# Patient Record
Sex: MALE | Race: WHITE | Employment: FULL TIME | URBAN - NONMETROPOLITAN AREA
[De-identification: names, ages, dates, MRNs, and addresses within clinical notes are randomized per-mention and may not be internally consistent; named-entity substitution may affect disease eponyms.]

---

## 2022-09-22 ENCOUNTER — HOSPITAL ENCOUNTER (OUTPATIENT)
Dept: GENERAL RADIOLOGY | Age: 52
Discharge: HOME | End: 2022-09-22
Payer: COMMERCIAL

## 2022-09-22 ENCOUNTER — OFFICE VISIT (OUTPATIENT)
Dept: RHEUMATOLOGY | Age: 52
End: 2022-09-22
Payer: COMMERCIAL

## 2022-09-22 ENCOUNTER — HOSPITAL ENCOUNTER (OUTPATIENT)
Age: 52
Discharge: HOME | End: 2022-09-22
Payer: COMMERCIAL

## 2022-09-22 ENCOUNTER — NURSE ONLY (OUTPATIENT)
Dept: LAB | Age: 52
End: 2022-09-22

## 2022-09-22 VITALS
SYSTOLIC BLOOD PRESSURE: 121 MMHG | DIASTOLIC BLOOD PRESSURE: 80 MMHG | OXYGEN SATURATION: 96 % | WEIGHT: 269 LBS | HEIGHT: 74 IN | HEART RATE: 80 BPM | BODY MASS INDEX: 34.52 KG/M2

## 2022-09-22 DIAGNOSIS — M79.642 PAIN IN BOTH HANDS: ICD-10-CM

## 2022-09-22 DIAGNOSIS — M25.512 CHRONIC PAIN OF BOTH SHOULDERS: ICD-10-CM

## 2022-09-22 DIAGNOSIS — G89.29 CHRONIC PAIN OF BOTH SHOULDERS: ICD-10-CM

## 2022-09-22 DIAGNOSIS — M25.511 CHRONIC PAIN OF BOTH SHOULDERS: ICD-10-CM

## 2022-09-22 DIAGNOSIS — M79.641 PAIN IN BOTH HANDS: ICD-10-CM

## 2022-09-22 DIAGNOSIS — M19.90 INFLAMMATORY ARTHRITIS: ICD-10-CM

## 2022-09-22 DIAGNOSIS — M19.90 INFLAMMATORY ARTHRITIS: Primary | ICD-10-CM

## 2022-09-22 LAB
ALBUMIN SERPL-MCNC: 4.5 G/DL (ref 3.5–5.1)
ALP BLD-CCNC: 62 U/L (ref 38–126)
ALT SERPL-CCNC: 21 U/L (ref 11–66)
ANION GAP SERPL CALCULATED.3IONS-SCNC: 12 MEQ/L (ref 8–16)
AST SERPL-CCNC: 14 U/L (ref 5–40)
BASOPHILS # BLD: 0.5 %
BASOPHILS ABSOLUTE: 0.1 THOU/MM3 (ref 0–0.1)
BILIRUB SERPL-MCNC: 0.6 MG/DL (ref 0.3–1.2)
BUN BLDV-MCNC: 25 MG/DL (ref 7–22)
C-REACTIVE PROTEIN: < 0.3 MG/DL (ref 0–1)
CALCIUM SERPL-MCNC: 9.8 MG/DL (ref 8.5–10.5)
CHLORIDE BLD-SCNC: 102 MEQ/L (ref 98–111)
CO2: 24 MEQ/L (ref 23–33)
CREAT SERPL-MCNC: 0.8 MG/DL (ref 0.4–1.2)
EOSINOPHIL # BLD: 0.2 %
EOSINOPHILS ABSOLUTE: 0 THOU/MM3 (ref 0–0.4)
ERYTHROCYTE [DISTWIDTH] IN BLOOD BY AUTOMATED COUNT: 12.9 % (ref 11.5–14.5)
ERYTHROCYTE [DISTWIDTH] IN BLOOD BY AUTOMATED COUNT: 41.6 FL (ref 35–45)
GFR SERPL CREATININE-BSD FRML MDRD: > 90 ML/MIN/1.73M2
GLUCOSE BLD-MCNC: 99 MG/DL (ref 70–108)
HCT VFR BLD CALC: 44.2 % (ref 42–52)
HEMOGLOBIN: 14.4 GM/DL (ref 14–18)
IMMATURE GRANS (ABS): 0.04 THOU/MM3 (ref 0–0.07)
IMMATURE GRANULOCYTES: 0.3 %
LYMPHOCYTES # BLD: 9.9 %
LYMPHOCYTES ABSOLUTE: 1.2 THOU/MM3 (ref 1–4.8)
MCH RBC QN AUTO: 28.6 PG (ref 26–33)
MCHC RBC AUTO-ENTMCNC: 32.6 GM/DL (ref 32.2–35.5)
MCV RBC AUTO: 87.7 FL (ref 80–94)
MONOCYTES # BLD: 5.4 %
MONOCYTES ABSOLUTE: 0.7 THOU/MM3 (ref 0.4–1.3)
NUCLEATED RED BLOOD CELLS: 0 /100 WBC
PLATELET # BLD: 246 THOU/MM3 (ref 130–400)
PMV BLD AUTO: 9.9 FL (ref 9.4–12.4)
POTASSIUM SERPL-SCNC: 4.4 MEQ/L (ref 3.5–5.2)
RBC # BLD: 5.04 MILL/MM3 (ref 4.7–6.1)
SEDIMENTATION RATE, ERYTHROCYTE: 20 MM/HR (ref 0–10)
SEG NEUTROPHILS: 83.7 %
SEGMENTED NEUTROPHILS ABSOLUTE COUNT: 10.3 THOU/MM3 (ref 1.8–7.7)
SODIUM BLD-SCNC: 138 MEQ/L (ref 135–145)
TOTAL CK: 63 U/L (ref 55–170)
TOTAL PROTEIN: 7.4 G/DL (ref 6.1–8)
URIC ACID: 5.5 MG/DL (ref 3.7–7)
WBC # BLD: 12.3 THOU/MM3 (ref 4.8–10.8)

## 2022-09-22 PROCEDURE — 73130 X-RAY EXAM OF HAND: CPT

## 2022-09-22 PROCEDURE — 99204 OFFICE O/P NEW MOD 45 MIN: CPT | Performed by: INTERNAL MEDICINE

## 2022-09-22 RX ORDER — PREDNISONE 10 MG/1
30 TABLET ORAL DAILY
Qty: 90 TABLET | Refills: 0 | Status: SHIPPED | OUTPATIENT
Start: 2022-09-22 | End: 2022-11-01 | Stop reason: SDUPTHER

## 2022-09-22 RX ORDER — VIT C/B6/B5/MAGNESIUM/HERB 173 50-5-6-5MG
500 CAPSULE ORAL DAILY
COMMUNITY

## 2022-09-22 RX ORDER — PREDNISONE 10 MG/1
10 TABLET ORAL DAILY
COMMUNITY
Start: 2022-09-12 | End: 2022-09-22 | Stop reason: SDUPTHER

## 2022-09-22 RX ORDER — OLMESARTAN MEDOXOMIL 40 MG/1
40 TABLET ORAL DAILY
COMMUNITY
Start: 2022-07-27

## 2022-09-22 ASSESSMENT — ENCOUNTER SYMPTOMS
VOMITING: 0
BLOOD IN STOOL: 0
NAUSEA: 0
COUGH: 0
ABDOMINAL PAIN: 0
WHEEZING: 0
SHORTNESS OF BREATH: 0

## 2022-09-22 NOTE — PROGRESS NOTES
[facility]   Rheumatology Clinic Note      9/22/2022       Reason for Consult:  joint pain  Requesting Physician:  Ender Ray DO     CHIEF COMPLAINT:    Chief Complaint   Patient presents with    New Patient     New patient. Arthralgia, unspecified joint  Bilateral shoulders. Bilateral joints in fingers, bilateral knees. Bilateral hips           HISTORY OF PRESENT ILLNESS:    47 y.o. male presents today for evaluation of joint pain. This summer, started abruptly with dull ache in both shoulders. Right is worse than left. This as progressively worsen. Associated with stiffness. Has difficulty raising his arms without assistance. Was prescribed prednisone 15 mg, which helped a lot. Then symtpoms worsen, so prednsione dose was increased to 20 mg daily, which again helped. However, he is having pain and stiffness in his hsoulders upon awakening in the morning. Also noted pain in the hips and knees. Pain in the fingers for past few days. Father has connective tissue disorder with ILD. Past Medical History:     has no past medical history on file. Past Surgical History:     has a past surgical history that includes Lumbar disc surgery (2003) and Lumbar disc surgery (1997). Current Medications:      Current Outpatient Medications:     olmesartan (BENICAR) 40 MG tablet, Take 40 mg by mouth daily, Disp: , Rfl:     turmeric 500 MG CAPS, Take 500 mg by mouth daily, Disp: , Rfl:     predniSONE (DELTASONE) 10 MG tablet, Take 3 tablets by mouth daily, Disp: 90 tablet, Rfl: 0    Allergies:    Patient has no known allergies. Social History:     reports that he has never smoked. His smokeless tobacco use includes chew. Family History:   family history includes Arthritis in his father; Neurodegenerative disease  in his father; Parkinson's Disease in his father. REVIEW OF SYSTEMS:    Review of Systems   Constitutional:  Positive for fatigue.  Negative for chills, fever and unexpected weight change. HENT:  Negative for mouth sores. Respiratory:  Negative for cough, shortness of breath and wheezing. Cardiovascular:  Negative for chest pain and leg swelling. Gastrointestinal:  Negative for abdominal pain, blood in stool, nausea and vomiting. Skin:  Negative for rash (no psoriasis). Neurological:  Negative for headaches. PHYSICAL EXAM:    Vitals:    /80 (Site: Right Lower Arm, Position: Sitting, Cuff Size: Medium Adult)   Pulse 80   Ht 6' 2\" (1.88 m)   Wt 269 lb (122 kg)   SpO2 96%   BMI 34.54 kg/m²     Physical Exam  Constitutional:       General: He is not in acute distress. Appearance: Normal appearance. HENT:      Mouth/Throat:      Mouth: Mucous membranes are moist.      Pharynx: Oropharynx is clear. Eyes:      Extraocular Movements: Extraocular movements intact. Conjunctiva/sclera: Conjunctivae normal.   Cardiovascular:      Rate and Rhythm: Normal rate and regular rhythm. Heart sounds: Normal heart sounds. No murmur heard. No friction rub. Pulmonary:      Effort: Pulmonary effort is normal. No respiratory distress. Breath sounds: Normal breath sounds. No wheezing or rhonchi. Musculoskeletal:         General: Tenderness present. No swelling or deformity. Normal range of motion. Cervical back: Normal range of motion and neck supple. Right lower leg: No edema. Left lower leg: No edema. Comments: ROM shoulders is intact, but was very uncomfortable. No synovitis in small joints of hands, wrists, elbows, shoulders. No knee effusion. Lymphadenopathy:      Cervical: No cervical adenopathy. Skin:     General: Skin is warm and dry. Findings: No lesion or rash. Neurological:      General: No focal deficit present. Mental Status: He is alert and oriented to person, place, and time. Mental status is at baseline. Cranial Nerves: No cranial nerve deficit.       Gait: Gait normal.   Psychiatric: Mood and Affect: Mood normal.          DATA:              IMPRESSION/RECOMMENDATIONS:      1. Suspect inflammatory process. Possibly PMR. Maybe atypical presentation. His ESR prior to starting prednsione is not remarkable. However, has noted significant improvement in shoulder and hip girdle pain/weakness/stiffness.  Alternatively, PsA could be a differential. Possible nail pitting was seen on exam.  -- increase prednsione to 30 mg daily  -- labs today  -- xray hands  -- RTC in 3 weeks        Orders Placed This Encounter   Procedures    XR HAND RIGHT (MIN 3 VIEWS)     Standing Status:   Future     Number of Occurrences:   1     Standing Expiration Date:   9/22/2023     Order Specific Question:   Reason for exam:     Answer:   assess for inflammatory arthritis    XR HAND LEFT (MIN 3 VIEWS)     Standing Status:   Future     Number of Occurrences:   1     Standing Expiration Date:   9/22/2023     Order Specific Question:   Reason for exam:     Answer:   assess for inflammatory arthritis    [id number] Antigen     Standing Status:   Future     Number of Occurrences:   1     Standing Expiration Date:   9/22/2023    CBC with Auto Differential     Standing Status:   Future     Number of Occurrences:   1     Standing Expiration Date:   3/22/2023    Comprehensive Metabolic Panel     Standing Status:   Future     Number of Occurrences:   1     Standing Expiration Date:   3/22/2023    Sedimentation Rate     Standing Status:   Future     Number of Occurrences:   1     Standing Expiration Date:   3/22/2023    Uric Acid     Standing Status:   Future     Number of Occurrences:   1     Standing Expiration Date:   3/22/2023    C-Reactive Protein     Standing Status:   Future     Number of Occurrences:   1     Standing Expiration Date:   3/22/2023    CK     Standing Status:   Future     Number of Occurrences:   1     Standing Expiration Date:   3/22/2023        Leandrew Olszewski, MD    [address] [place] [address] [address]

## 2022-09-25 LAB — HLA-B27: NEGATIVE

## 2022-10-06 ENCOUNTER — OFFICE VISIT (OUTPATIENT)
Dept: RHEUMATOLOGY | Age: 52
End: 2022-10-06
Payer: COMMERCIAL

## 2022-10-06 VITALS
SYSTOLIC BLOOD PRESSURE: 110 MMHG | HEIGHT: 74 IN | DIASTOLIC BLOOD PRESSURE: 80 MMHG | WEIGHT: 274 LBS | HEART RATE: 66 BPM | BODY MASS INDEX: 35.16 KG/M2 | OXYGEN SATURATION: 95 %

## 2022-10-06 DIAGNOSIS — M19.90 INFLAMMATORY ARTHRITIS: Primary | ICD-10-CM

## 2022-10-06 DIAGNOSIS — Z79.899 ENCOUNTER FOR LONG-TERM (CURRENT) USE OF HIGH-RISK MEDICATION: ICD-10-CM

## 2022-10-06 LAB
ALBUMIN SERPL-MCNC: 4.8 G/DL
ALP BLD-CCNC: 58 U/L
ALT SERPL-CCNC: 20 U/L
ANION GAP SERPL CALCULATED.3IONS-SCNC: 10 MMOL/L
AST SERPL-CCNC: 13 U/L
BASOPHILS ABSOLUTE: 0.03 /ΜL
BASOPHILS RELATIVE PERCENT: 0.3 %
BILIRUB SERPL-MCNC: 1.1 MG/DL (ref 0.1–1.4)
BUN BLDV-MCNC: 25 MG/DL
C-REACTIVE PROTEIN: 0.3
CALCIUM SERPL-MCNC: 9.9 MG/DL
CHLORIDE BLD-SCNC: 98 MMOL/L
CO2: 30 MMOL/L
CREAT SERPL-MCNC: 0.87 MG/DL
EOSINOPHILS ABSOLUTE: 0.03 /ΜL
EOSINOPHILS RELATIVE PERCENT: 0.3 %
GFR CALCULATED: 104
GLUCOSE BLD-MCNC: 106 MG/DL
HCT VFR BLD CALC: 42.6 % (ref 41–53)
HEMOGLOBIN: 14.5 G/DL (ref 13.5–17.5)
LYMPHOCYTES ABSOLUTE: 0.97 /ΜL
LYMPHOCYTES RELATIVE PERCENT: 10 %
MCH RBC QN AUTO: 28.7 PG
MCHC RBC AUTO-ENTMCNC: 34 G/DL
MCV RBC AUTO: 84.4 FL
MONOCYTES ABSOLUTE: 0.56 /ΜL
MONOCYTES RELATIVE PERCENT: 5.8 %
NEUTROPHILS ABSOLUTE: 8.08 /ΜL
NEUTROPHILS RELATIVE PERCENT: 83.1 %
PDW BLD-RTO: 12.9 %
PLATELET # BLD: 245 K/ΜL
PMV BLD AUTO: 10.3 FL
POTASSIUM SERPL-SCNC: 4.4 MMOL/L
RBC # BLD: 5.05 10^6/ΜL
SEDIMENTATION RATE, ERYTHROCYTE: 18
SODIUM BLD-SCNC: 138 MMOL/L
TOTAL PROTEIN: 7.2
WBC # BLD: 9.7 10^3/ML

## 2022-10-06 PROCEDURE — 99214 OFFICE O/P EST MOD 30 MIN: CPT | Performed by: INTERNAL MEDICINE

## 2022-10-06 RX ORDER — FOLIC ACID 1 MG/1
1 TABLET ORAL DAILY
Qty: 90 TABLET | Refills: 1 | Status: SHIPPED | OUTPATIENT
Start: 2022-10-06 | End: 2023-01-04

## 2022-10-06 ASSESSMENT — ENCOUNTER SYMPTOMS
BACK PAIN: 0
SHORTNESS OF BREATH: 0
NAUSEA: 0
DIARRHEA: 0
PHOTOPHOBIA: 0
VOMITING: 0
COUGH: 0
ABDOMINAL PAIN: 0
RHINORRHEA: 0
WHEEZING: 0

## 2022-10-06 NOTE — PROGRESS NOTES
Deepa Gr (:  1970) is a 47 y.o. male,Established patient, here for evaluation of the following chief complaint(s):  Follow-up (2 week follow up)         ASSESSMENT/PLAN:  1. Inflammatory arthritis  -     CBC with Auto Differential; Future  -     Comprehensive Metabolic Panel; Future  -     C-Reactive Protein; Future  -     Sedimentation Rate; Future  -     methotrexate (RHEUMATREX) 2.5 MG chemo tablet; Take 6 tablets by mouth once a week, Disp-24 tablet, [id number]  -     folic acid (FOLVITE) 1 MG tablet; Take 1 tablet by mouth daily, Disp-90 tablet, R-1Normal  2. Encounter for long-term (current) use of high-risk medication  -     CBC with Auto Differential; Future  -     Comprehensive Metabolic Panel; Future  -     C-Reactive Protein; Future  -     Sedimentation Rate; Future  -     methotrexate (RHEUMATREX) 2.5 MG chemo tablet; Take 6 tablets by mouth once a week, Disp-24 tablet, [id number]  -     folic acid (FOLVITE) 1 MG tablet; Take 1 tablet by mouth daily, Disp-90 tablet, R-1Normal    Return in about 6 weeks (around 2022). Assessment / Plan:  Inflammatory Arthritis - Will start patient on Methotrexate and Folic Acid, and slowly taper off the steroids over weeks. Subjective   SUBJECTIVE/OBJECTIVE:  HPI    Patient was started on 30 mg prednisone at last office visit. He states that he take 20 mg in the morning and 10 mg in the evening between 6-7pm. His symptoms have improved significantly. Patient is now sleeping throughout the entire night without pain. He has brief stiffness in the morning, but much improved. Overall, he is able to function much better . Review of Systems   Constitutional:  Negative for diaphoresis, fatigue and fever. HENT:  Negative for congestion, postnasal drip and rhinorrhea. Eyes:  Negative for photophobia and visual disturbance. Respiratory:  Negative for cough, shortness of breath and wheezing.     Cardiovascular:  Negative for chest pain, palpitations and leg swelling. Gastrointestinal:  Negative for abdominal pain, diarrhea, nausea and vomiting. Genitourinary:  Negative for dysuria, frequency, hematuria and urgency. Musculoskeletal:  Negative for back pain and myalgias. Skin:  Negative for pallor and rash. Neurological:  Negative for dizziness, facial asymmetry, light-headedness and headaches. Psychiatric/Behavioral:  Negative for behavioral problems, confusion and decreased concentration. Objective   Physical Exam  Constitutional:       General: He is not in acute distress. Appearance: He is not ill-appearing or toxic-appearing. HENT:      Head: Normocephalic and atraumatic. Right Ear: External ear normal.      Left Ear: External ear normal.   Eyes:      General: No scleral icterus. Right eye: No discharge. Left eye: No discharge. Cardiovascular:      Rate and Rhythm: Normal rate and regular rhythm. Pulses: Normal pulses. Heart sounds: Normal heart sounds. No murmur heard. No friction rub. No gallop. Pulmonary:      Effort: Pulmonary effort is normal. No respiratory distress. Breath sounds: Normal breath sounds. No wheezing. Abdominal:      General: Abdomen is flat. Bowel sounds are normal. There is no distension. Tenderness: There is no abdominal tenderness. There is no guarding. Musculoskeletal:         General: Normal range of motion. Cervical back: Normal range of motion and neck supple. Right lower leg: No edema. Left lower leg: No edema. Skin:     General: Skin is warm and dry. Capillary Refill: Capillary refill takes less than 2 seconds. Neurological:      General: No focal deficit present. Mental Status: He is alert and oriented to person, place, and time. Psychiatric:         Mood and Affect: Mood normal.         Behavior: Behavior normal.          An electronic signature was used to authenticate this note.     --Luis Tarango MD Internal Medicine Resident, PGY-3

## 2022-10-06 NOTE — PROGRESS NOTES
never smoked. His smokeless tobacco use includes chew. Family History:   family history includes Arthritis in his father; Neurodegenerative disease  in his father; Parkinson's Disease in his father. REVIEW OF SYSTEMS:    Review of Systems   Constitutional:  Negative for chills and fever. Respiratory:  Negative for cough and shortness of breath. Cardiovascular:  Negative for chest pain. Gastrointestinal:  Negative for abdominal pain, nausea and vomiting. Skin:  Negative for rash (no psoriasis). PHYSICAL EXAM:    Vitals:    /80 (Site: Left Upper Arm, Position: Sitting, Cuff Size: Large Adult)   Pulse 66   Ht 6' 2.02\" (1.88 m)   Wt 274 lb (124.3 kg)   SpO2 95%   BMI 35.16 kg/m²     Physical Exam  Constitutional:       General: He is not in acute distress. Appearance: Normal appearance. Eyes:      Conjunctiva/sclera: Conjunctivae normal.   Pulmonary:      Effort: Pulmonary effort is normal.   Musculoskeletal:         General: No swelling, tenderness or deformity. Cervical back: Normal range of motion and neck supple. Skin:     General: Skin is warm and dry. Findings: No rash. Neurological:      General: No focal deficit present. Mental Status: He is alert and oriented to person, place, and time. Gait: Gait normal.   Psychiatric:         Mood and Affect: Mood normal.          DATA:                IMPRESSION/RECOMMENDATIONS:      1. Suspect inflammatory arthritis (seronegative). Clinically improved significantly with prednsione 30 mg daily. Discussed with pt the natural h/o autoimmune inflammatory arthritis and outlined treatment plan. -- trial of methotrexate 15 mg weekly and folic acid 1 mg daily. Discussed with pt the benefits, risks and adverse effects of this medication.  Patient expressed understanding.  -- taper prednisone as follows: 30 mg daily x 2 weeks, then 25 mg daily x 1wk, then 20 mg daily x 1wk, then 15 mg daily x 1 week, then 10 mg daily till next visit.     2. High risk medication requiring close monitoring:  -- MTX: monitor CBC and CMP every 6 weeks    RTC in 6 weeks      Orders Placed This Encounter   Procedures    CBC with Auto Differential     Standing Status:   Future     Standing Expiration Date:   4/6/2023    Comprehensive Metabolic Panel     Standing Status:   Future     Standing Expiration Date:   10/6/2023    C-Reactive Protein     Standing Status:   Future     Standing Expiration Date:   10/6/2023    Sedimentation Rate     Standing Status:   Future     Standing Expiration Date:   10/6/2023        Llya Sloan MD    [address] [place] [address]  [phone]

## 2022-11-01 DIAGNOSIS — M79.641 PAIN IN BOTH HANDS: ICD-10-CM

## 2022-11-01 DIAGNOSIS — G89.29 CHRONIC PAIN OF BOTH SHOULDERS: ICD-10-CM

## 2022-11-01 DIAGNOSIS — M25.512 CHRONIC PAIN OF BOTH SHOULDERS: ICD-10-CM

## 2022-11-01 DIAGNOSIS — M25.511 CHRONIC PAIN OF BOTH SHOULDERS: ICD-10-CM

## 2022-11-01 DIAGNOSIS — M79.642 PAIN IN BOTH HANDS: ICD-10-CM

## 2022-11-01 DIAGNOSIS — M19.90 INFLAMMATORY ARTHRITIS: ICD-10-CM

## 2022-11-01 RX ORDER — PREDNISONE 10 MG/1
20 TABLET ORAL DAILY
Qty: 60 TABLET | Refills: 0 | Status: SHIPPED | OUTPATIENT
Start: 2022-11-01 | End: 2022-11-17

## 2022-11-01 NOTE — TELEPHONE ENCOUNTER
Prednisone prescription was sent to pt's pharmacy. Please instruct pt to follow the taper regimen that was provided to him in last office visit. Thank you.

## 2022-11-07 ENCOUNTER — TELEPHONE (OUTPATIENT)
Dept: RHEUMATOLOGY | Age: 52
End: 2022-11-07

## 2022-11-07 DIAGNOSIS — Z51.81 MEDICATION MONITORING ENCOUNTER: Primary | ICD-10-CM

## 2022-11-07 NOTE — TELEPHONE ENCOUNTER
Patient is calling in regarding his standing lab orders. He said [facility] in [place] told him the orders they had were not standing orders. Can the standing lab orders be faxed to them again at [phone]? Please fax.

## 2022-11-15 LAB
ALBUMIN SERPL-MCNC: 4.6 G/DL
ALP BLD-CCNC: 59 U/L
ALT SERPL-CCNC: 29 U/L
ANION GAP SERPL CALCULATED.3IONS-SCNC: 12 MMOL/L
AST SERPL-CCNC: 18 U/L
BASOPHILS ABSOLUTE: 0.04 /ΜL
BASOPHILS RELATIVE PERCENT: 0.5 %
BILIRUB SERPL-MCNC: 1.4 MG/DL (ref 0.1–1.4)
BUN BLDV-MCNC: 16 MG/DL
C-REACTIVE PROTEIN: 0.6
CALCIUM SERPL-MCNC: 9.6 MG/DL
CHLORIDE BLD-SCNC: 100 MMOL/L
CO2: 27 MMOL/L
CREAT SERPL-MCNC: 0.86 MG/DL
EOSINOPHILS ABSOLUTE: 0.07 /ΜL
EOSINOPHILS RELATIVE PERCENT: 0.9 %
GFR CALCULATED: 105
GLUCOSE BLD-MCNC: 129 MG/DL
HCT VFR BLD CALC: 44.7 % (ref 41–53)
HEMOGLOBIN: 15.1 G/DL (ref 13.5–17.5)
LYMPHOCYTES ABSOLUTE: 2.2 /ΜL
LYMPHOCYTES RELATIVE PERCENT: 29.4 %
MCH RBC QN AUTO: 28.8 PG
MCHC RBC AUTO-ENTMCNC: 33.8 G/DL
MCV RBC AUTO: 85.3 FL
MONOCYTES ABSOLUTE: 0.4 /ΜL
MONOCYTES RELATIVE PERCENT: 5.3 %
NEUTROPHILS ABSOLUTE: 4.72 /ΜL
NEUTROPHILS RELATIVE PERCENT: 63.2 %
PDW BLD-RTO: 13.5 %
PLATELET # BLD: 235 K/ΜL
PMV BLD AUTO: 10.5 FL
POTASSIUM SERPL-SCNC: 3.9 MMOL/L
RBC # BLD: 5.24 10^6/ΜL
SEDIMENTATION RATE, ERYTHROCYTE: 20
SODIUM BLD-SCNC: 139 MMOL/L
TOTAL PROTEIN: 6.7
WBC # BLD: 7.5 10^3/ML

## 2022-11-17 ENCOUNTER — OFFICE VISIT (OUTPATIENT)
Dept: RHEUMATOLOGY | Age: 52
End: 2022-11-17
Payer: COMMERCIAL

## 2022-11-17 VITALS
OXYGEN SATURATION: 95 % | HEIGHT: 74 IN | DIASTOLIC BLOOD PRESSURE: 86 MMHG | BODY MASS INDEX: 35.81 KG/M2 | SYSTOLIC BLOOD PRESSURE: 124 MMHG | WEIGHT: 279 LBS | HEART RATE: 91 BPM

## 2022-11-17 DIAGNOSIS — Z79.899 ENCOUNTER FOR LONG-TERM (CURRENT) USE OF HIGH-RISK MEDICATION: ICD-10-CM

## 2022-11-17 DIAGNOSIS — M19.90 INFLAMMATORY ARTHRITIS: Primary | ICD-10-CM

## 2022-11-17 PROCEDURE — 99214 OFFICE O/P EST MOD 30 MIN: CPT | Performed by: INTERNAL MEDICINE

## 2022-11-17 RX ORDER — LEFLUNOMIDE 20 MG/1
20 TABLET ORAL DAILY
Qty: 30 TABLET | Refills: 1 | Status: SHIPPED | OUTPATIENT
Start: 2022-11-17 | End: 2022-12-17

## 2022-11-17 RX ORDER — IBUPROFEN 200 MG
200 TABLET ORAL EVERY 6 HOURS PRN
COMMUNITY

## 2022-11-17 RX ORDER — PREDNISONE 1 MG/1
5 TABLET ORAL 2 TIMES DAILY
Qty: 60 TABLET | Refills: 2 | Status: SHIPPED | OUTPATIENT
Start: 2022-11-17 | End: 2022-12-17

## 2022-11-17 ASSESSMENT — ENCOUNTER SYMPTOMS
SHORTNESS OF BREATH: 0
VOMITING: 0
COUGH: 0
NAUSEA: 0
ABDOMINAL PAIN: 0

## 2022-11-17 NOTE — PROGRESS NOTES
[facility]) Physicians   Rheumatology Clinic Note      11/17/2022       CHIEF COMPLAINT:    Chief Complaint   Patient presents with    Follow-up     6 wk f/u, Inflammatory arthritis    Pt stated pain is about 1/10           HISTORY OF PRESENT ILLNESS:    47 y.o. male with suspected inflammatory arthritis (?seronegative RA) presents for follow up. When seen last, he was started on methotrexate 15 mg weekly and folic acid 1 mg daily. Prednisone was tapered down to 10 mg daily. When seen initially, it was felt that he may have PMR based on having symptoms mainly affecting his shoulders and hips. However later, he was reporting pain and stiffness in his hands and knees; so diagnosis was shifted to seronegative rheumatoid arthritis. He does not believe that methotrexate is helping any. Reports that when he was on prednisone 20 mg daily, he was doing really well. However, after decreasing to 10 mg daily, he is noticing more stiffness and pain in his hands. When taking the prednisone 10 mg in the morning, he would have pain and stiffness in his shoulders, hips and hands; and felt that he is back to square one. This improved partially when he started taking prednisone 10 mg in evening. He has been taking ibuprofen as needed during the day to help with pain and stiffness. Reports worsening symptoms with more sedentary days and improvement with movement. Today, he has mild achiness in his knuckles associated with stiffness. RECAP:  Summer 2022, started abruptly with dull ache in both shoulders. Right is worse than left. This as progressively worsen. Associated with stiffness. Has difficulty raising his arms without assistance. Was prescribed prednisone 15 mg, which helped a lot. Then symtpoms worsen and progressed to involve his hips and knees, so prednsione dose was increased to 20 mg daily, which again helped. Father has connective tissue disorder with ILD.       Past Medical History:     has no past medical history on file. Past Surgical History:     has a past surgical history that includes Lumbar disc surgery (2003) and Lumbar disc surgery (1997). Current Medications:      Current Outpatient Medications:     ibuprofen (ADVIL;MOTRIN) 200 MG tablet, Take 200 mg by mouth every 6 hours as needed, Disp: , Rfl:     leflunomide (ARAVA) 20 MG tablet, Take 1 tablet by mouth daily, Disp: 30 tablet, Rfl: 1    predniSONE (DELTASONE) 5 MG tablet, Take 1 tablet by mouth 2 times daily, Disp: 60 tablet, Rfl: 2    olmesartan (BENICAR) 40 MG tablet, Take 40 mg by mouth daily, Disp: , Rfl:     turmeric 500 MG CAPS, Take 500 mg by mouth daily (Patient not taking: Reported on 11/17/2022), Disp: , Rfl:     Allergies:    Patient has no known allergies. Social History:     reports that he has never smoked. His smokeless tobacco use includes chew. Family History:   family history includes Arthritis in his father; Neurodegenerative disease  in his father; Parkinson's Disease in his father. REVIEW OF SYSTEMS:    Review of Systems   Constitutional:  Negative for chills and fever. Respiratory:  Negative for cough and shortness of breath. Cardiovascular:  Negative for chest pain. Gastrointestinal:  Negative for abdominal pain, nausea and vomiting. Skin:  Negative for rash. PHYSICAL EXAM:    Vitals:    /86 (Site: Left Upper Arm, Position: Sitting, Cuff Size: Large Adult)   Pulse 91   Ht 6' 2.02\" (1.88 m)   Wt 279 lb (126.6 kg)   SpO2 95%   BMI 35.81 kg/m²     Physical Exam  Constitutional:       General: He is not in acute distress. Appearance: Normal appearance. Eyes:      Conjunctiva/sclera: Conjunctivae normal.   Pulmonary:      Effort: Pulmonary effort is normal.   Musculoskeletal:         General: Tenderness (tenderness in PIPs in hands) present. No swelling or deformity. Normal range of motion. Cervical back: Neck supple. Skin:     General: Skin is warm and dry.       Findings: No rash. Neurological:      General: No focal deficit present. Mental Status: He is alert and oriented to person, place, and time. Gait: Gait normal.   Psychiatric:         Mood and Affect: Mood normal.          DATA:     Latest Reference Range & Units 11/15/22 00:00   Sodium mmol/L 139 (E)   Potassium mmol/L 3.9 (E)   Chloride mmol/L 100 (E)   CO2 mmol/L 27 (E)   BUN,BUNPL mg/dL 16 (E)   Creatinine  0.86 (E)   Anion Gap mmol/L 12.0 (E)   Gfr Calculated  105 (E)   Glucose, Random mg/dL 129 (E)   CALCIUM, SERUM, 218419 mg/dL 9.6 (E)   Total Protein  6.7 (E)      11/15/22 00:00   CRP 0.6 (E)      Latest Reference Range & Units 11/15/22 00:00   WBC 10^3/mL 7.5 (E)   RBC 10^6/µL 5.24 (E)   Hemoglobin Quant 13.5 - 17.5 g/dL 15.1 (E)   Hematocrit 41 - 53 % 44.7 (E)   MCV fL 85.3 (E)   MCH pg 28.8 (E)   MCHC g/dL 33.8 (E)   MPV fL 10.5 (E)   RDW % 13.5 (E)   Platelet Count K/µL 269 (E)      11/15/22 00:00   Sed Rate 20 (E)       Latest Reference Range & Units 11/15/22 00:00   Albumin  4.6 (E)   Alk Phos U/L 59 (E)   ALT U/L 29 (E)   AST U/L 18 (E)   Bilirubin 0.1 - 1.4 mg/dL 1.4 (E)   Total Protein  6.7 (E)          IMPRESSION/RECOMMENDATIONS:      1. Suspect inflammatory arthritis (seronegative RA). Clinically improved significantly with prednsione 30 mg daily. Some worsening symptoms as prednisone dose decreased. Methotrexate does not seem to be effective. -- d/c MTX  -- trial of leflunomide 20 mg daily. Discussed with pt the benefits, risks and adverse effects of this medication. Patient expressed understanding.  -- continue prednisone 10 mg daily, will change regimen from once daily to 5 mg bid. -- if no improvement by next visit, would consider Actemra SQ    2.  High risk medication requiring close monitoring:  -- LEF: monitor CBC and CMP every 4-6 weeks    RTC in 7 weeks      Orders Placed This Encounter   Procedures    Quantiferon TB Gold     Standing Status:   Future     Standing Expiration Date: 5/17/2023    CBC with Auto Differential     Standing Status:   Future     Standing Expiration Date:   5/17/2023    Comprehensive Metabolic Panel     Standing Status:   Future     Standing Expiration Date:   5/17/2023    C-Reactive Protein     Standing Status:   Future     Standing Expiration Date:   5/17/2023    Sedimentation Rate     Standing Status:   Future     Standing Expiration Date:   5/17/2023    Hepatitis Panel, Acute     Standing Status:   Future     Standing Expiration Date:   5/17/2023        Reggie Gracia MD    [address] [facility]  [address] [facility]. [address] [facility]  [address] [facility] [place] [address]

## 2023-01-03 LAB
ALBUMIN SERPL-MCNC: 4.7 G/DL
ALP BLD-CCNC: 64 U/L
ALT SERPL-CCNC: 27 U/L
ANION GAP SERPL CALCULATED.3IONS-SCNC: 12 MMOL/L
AST SERPL-CCNC: 21 U/L
BASOPHILS ABSOLUTE: 0.04 /ΜL
BASOPHILS RELATIVE PERCENT: 0.9 %
BILIRUB SERPL-MCNC: 0.4 MG/DL (ref 0.1–1.4)
BUN BLDV-MCNC: 17 MG/DL
C-REACTIVE PROTEIN: 0.3
CALCIUM SERPL-MCNC: 9.7 MG/DL
CHLORIDE BLD-SCNC: 102 MMOL/L
CO2: 28 MMOL/L
CREAT SERPL-MCNC: 0.95 MG/DL
EOSINOPHILS ABSOLUTE: 0.14 /ΜL
EOSINOPHILS RELATIVE PERCENT: 3.2 %
GFR CALCULATED: 97
GLUCOSE BLD-MCNC: 120 MG/DL
HCT VFR BLD CALC: 44 % (ref 41–53)
HEMOGLOBIN: 14.7 G/DL (ref 13.5–17.5)
LYMPHOCYTES ABSOLUTE: 1.22 /ΜL
LYMPHOCYTES RELATIVE PERCENT: 27.7 %
MCH RBC QN AUTO: 28.8 PG
MCHC RBC AUTO-ENTMCNC: 33.4 G/DL
MCV RBC AUTO: 86.1 FL
MONOCYTES ABSOLUTE: 0.48 /ΜL
MONOCYTES RELATIVE PERCENT: 10.9 %
NEUTROPHILS ABSOLUTE: 2.51 /ΜL
NEUTROPHILS RELATIVE PERCENT: 57.1 %
PDW BLD-RTO: 12 %
PLATELET # BLD: 172 K/ΜL
PMV BLD AUTO: 11.4 FL
POTASSIUM SERPL-SCNC: 4.3 MMOL/L
QUANTI TB GOLD PLUS: NORMAL
QUANTI TB1 MINUS NIL: NORMAL
QUANTI TB2 MINUS NIL: NORMAL
QUANTIFERON MITOGEN: NORMAL
QUANTIFERON NIL: NORMAL
RBC # BLD: 5.11 10^6/ΜL
SEDIMENTATION RATE, ERYTHROCYTE: 19
SODIUM BLD-SCNC: 142 MMOL/L
TOTAL PROTEIN: 7.1
WBC # BLD: 4.4 10^3/ML

## 2023-01-05 DIAGNOSIS — Z79.899 ENCOUNTER FOR LONG-TERM (CURRENT) USE OF HIGH-RISK MEDICATION: ICD-10-CM

## 2023-01-05 DIAGNOSIS — M19.90 INFLAMMATORY ARTHRITIS: ICD-10-CM

## 2023-01-05 NOTE — TELEPHONE ENCOUNTER
Folic acid was part of methotrexate treatment. Since he is not on methotrexate any more, he does not need to take folic acid.

## 2023-01-05 NOTE — TELEPHONE ENCOUNTER
Pt was incorrectly scheduled w/ when he got here for appt we realized the issue, spoke with pt apologized for our mistake and he was extremely understanding and willing to r/s. [facility] on 01/03 and is needing refill on [address] Didn't see results so I called and spoke with [facility] she is still waiting the TB results but will push everything over to our system that is resulted. Please refill Arava when appropriate.

## 2023-01-05 NOTE — TELEPHONE ENCOUNTER
Patient calling in. He is asking about his folic acid and if he is supposed to still be taking that? He is out and called the pharmacy for a refill but they told him the office cancelled the prescription. He did say he has enough of the arava/leflunomide until his appt on 1/10/2023. Please call him to advise about the folic acid.

## 2023-01-06 RX ORDER — LEFLUNOMIDE 20 MG/1
20 TABLET ORAL DAILY
Qty: 30 TABLET | Refills: 1 | OUTPATIENT
Start: 2023-01-06 | End: 2023-02-05

## 2023-01-06 NOTE — TELEPHONE ENCOUNTER
Phoned pt no answer LM informing note below, also noticed we didn't get labs, called and requested labs again

## 2023-01-10 ENCOUNTER — OFFICE VISIT (OUTPATIENT)
Dept: RHEUMATOLOGY | Age: 53
End: 2023-01-10
Payer: COMMERCIAL

## 2023-01-10 VITALS
SYSTOLIC BLOOD PRESSURE: 136 MMHG | DIASTOLIC BLOOD PRESSURE: 82 MMHG | BODY MASS INDEX: 35.81 KG/M2 | HEIGHT: 74 IN | WEIGHT: 279 LBS | HEART RATE: 71 BPM | OXYGEN SATURATION: 98 %

## 2023-01-10 DIAGNOSIS — Z79.899 ENCOUNTER FOR LONG-TERM (CURRENT) USE OF HIGH-RISK MEDICATION: ICD-10-CM

## 2023-01-10 DIAGNOSIS — M19.90 INFLAMMATORY ARTHRITIS: ICD-10-CM

## 2023-01-10 PROCEDURE — 99214 OFFICE O/P EST MOD 30 MIN: CPT | Performed by: INTERNAL MEDICINE

## 2023-01-10 RX ORDER — PREDNISONE 1 MG/1
5 TABLET ORAL 2 TIMES DAILY
COMMUNITY

## 2023-01-10 RX ORDER — LEFLUNOMIDE 20 MG/1
20 TABLET ORAL DAILY
Qty: 30 TABLET | Refills: 2 | Status: SHIPPED | OUTPATIENT
Start: 2023-01-10 | End: 2023-02-09

## 2023-01-10 ASSESSMENT — ENCOUNTER SYMPTOMS
NAUSEA: 0
VOMITING: 0
COUGH: 0
ABDOMINAL PAIN: 0
SHORTNESS OF BREATH: 0

## 2023-01-10 NOTE — PROGRESS NOTES
[address] [facility]   Rheumatology Clinic Note      1/10/2023       CHIEF COMPLAINT:    Chief Complaint   Patient presents with    Follow-up     7 wk f/u Inflammatory arthritis    Other     Pt feels as if he is having a flare. He is having a lot pain in the hands last couple weeks           HISTORY OF PRESENT ILLNESS:    47 y.o. male with suspected inflammatory arthritis (?seronegative RA) presents for follow up. He is presently on leflunomide 20 mg daily (started last visit) and prednisone 5 mg bid. Past med: methotrexate (ineffective)    Reports having few flare ups involving his hands over past couple of months. Pain and stiffness. No significant swelling. The flare ups lasts for few days and improves with ibuprofen. Today, pain is most pronounced in his hands. It is mild and tolerable. Associated with stiffness. No joint swelling. RECAP:  Summer 2022, started abruptly with dull ache in both shoulders. Right is worse than left. This as progressively worsen. Associated with stiffness. Has difficulty raising his arms without assistance. Was prescribed prednisone 15 mg, which helped a lot. Then symtpoms worsen and progressed to involve his hips and knees, so prednsione dose was increased to 20 mg daily, which again helped. Father has connective tissue disorder with ILD. Past Medical History:     has no past medical history on file. Past Surgical History:     has a past surgical history that includes Lumbar disc surgery (2003) and Lumbar disc surgery (1997).     Current Medications:      Current Outpatient Medications:     predniSONE (DELTASONE) 5 MG tablet, Take 5 mg by mouth 2 times daily, Disp: , Rfl:     leflunomide (ARAVA) 20 MG tablet, Take 1 tablet by mouth daily, Disp: 30 tablet, Rfl: 2    ibuprofen (ADVIL;MOTRIN) 200 MG tablet, Take 200 mg by mouth every 6 hours as needed, Disp: , Rfl:     olmesartan (BENICAR) 40 MG tablet, Take 40 mg by mouth daily, Disp: , Rfl:     turmeric 500 MG CAPS, Take 500 mg by mouth daily (Patient not taking: No sig reported), Disp: , Rfl:     Allergies:    Patient has no known allergies. Social History:     reports that he has never smoked. His smokeless tobacco use includes chew. Family History:   family history includes Arthritis in his father; Neurodegenerative disease  in his father; Parkinson's Disease in his father. REVIEW OF SYSTEMS:    Review of Systems   Constitutional:  Negative for chills and fever. Respiratory:  Negative for cough and shortness of breath. Cardiovascular:  Negative for chest pain. Gastrointestinal:  Negative for abdominal pain, nausea and vomiting. Skin:  Negative for rash. PHYSICAL EXAM:    Vitals:    /82 (Site: Left Lower Arm, Position: Sitting, Cuff Size: Medium Adult)   Pulse 71   Ht 6' 2.02\" (1.88 m)   Wt 279 lb (126.6 kg)   SpO2 98%   BMI 35.81 kg/m²     Physical Exam  Constitutional:       General: He is not in acute distress. Appearance: Normal appearance. Eyes:      Conjunctiva/sclera: Conjunctivae normal.   Pulmonary:      Effort: Pulmonary effort is normal.   Musculoskeletal:         General: Tenderness (tenderness in PIPs in hands) present. No swelling or deformity. Normal range of motion. Cervical back: Neck supple. Skin:     General: Skin is warm and dry. Findings: No rash. Neurological:      General: No focal deficit present. Mental Status: He is alert and oriented to person, place, and time.       Gait: Gait normal.   Psychiatric:         Mood and Affect: Mood normal.          DATA:     Latest Reference Range & Units 1/3/23 00:00   Sodium mmol/L 142 (E)   Potassium mmol/L 4.3 (E)   Chloride mmol/L 102 (E)   CO2 mmol/L 28 (E)   BUN,BUNPL mg/dL 17 (E)   Creatinine  0.95 (E)   Anion Gap mmol/L 12.0 (E)   Gfr Calculated  97 (E)   Glucose, Random mg/dL 120 (E)   CALCIUM, SERUM, 713738 mg/dL 9.7 (E)   Total Protein  7.1 (E)      1/3/23 00:00   CRP 0.3 (E) Latest Reference Range & Units 1/3/23 00:00   Albumin  4.7 (E)   Alk Phos U/L 64 (E)   ALT U/L 27 (E)   AST U/L 21 (E)   Bilirubin 0.1 - 1.4 mg/dL 0.4 (E)   Total Protein  7.1 (E)      Latest Reference Range & Units 1/3/23 00:00   WBC 10^3/mL 4.4 (E)   RBC 10^6/µL 5.11 (E)   Hemoglobin Quant 13.5 - 17.5 g/dL 14.7 (E)   Hematocrit 41 - 53 % 44.0 (E)   MCV fL 86.1 (E)   MCH pg 28.8 (E)   MCHC g/dL 33.4 (E)   MPV fL 11.4 (E)   RDW % 12.0 (E)   Platelet Count K/µL 848 (E)      1/3/23 00:00   Sed Rate 19 (E)         IMPRESSION/RECOMMENDATIONS:      1. Suspect inflammatory arthritis (seronegative RA). Stable and improved with current medication regimen. Has episodes of flare ups. -- continue leflunomide 20 mg daily. -- continue prednisone 5 mg bid. -- if worsening symptoms, would consider Actemra SQ    2. High risk medication requiring close monitoring:  -- LEF: monitor CBC and CMP every 6-8 weeks. Reviewed with pt his lab results.     RTC in 8 weeks      Orders Placed This Encounter   Procedures    CBC with Auto Differential     Standing Status:   Future     Standing Expiration Date:   7/10/2023    Comprehensive Metabolic Panel     Standing Status:   Future     Standing Expiration Date:   7/10/2023    C-Reactive Protein     Standing Status:   Future     Standing Expiration Date:   7/10/2023    Sedimentation Rate     Standing Status:   Future     Standing Expiration Date:   7/10/2023          Christian Ramon MD    [address] [facility]  [address] [address] [facility]  [address]  [phone]

## 2023-03-07 LAB
ALBUMIN SERPL-MCNC: 4.6 G/DL
ALP BLD-CCNC: 54 U/L
ALT SERPL-CCNC: 33 U/L
ANION GAP SERPL CALCULATED.3IONS-SCNC: 10 MMOL/L
AST SERPL-CCNC: 25 U/L
BASOPHILS ABSOLUTE: 0.07 /ΜL
BASOPHILS RELATIVE PERCENT: 1.4 %
BILIRUB SERPL-MCNC: 0.6 MG/DL (ref 0.1–1.4)
BUN BLDV-MCNC: 24 MG/DL
C-REACTIVE PROTEIN: 0.3
CALCIUM SERPL-MCNC: 9.6 MG/DL
CHLORIDE BLD-SCNC: 106 MMOL/L
CO2: 27 MMOL/L
CREAT SERPL-MCNC: 0.86 MG/DL
EGFR: 104
EOSINOPHILS ABSOLUTE: 0.19 /ΜL
EOSINOPHILS RELATIVE PERCENT: 3.7 %
GLUCOSE BLD-MCNC: 94 MG/DL
HCT VFR BLD CALC: 43.3 % (ref 41–53)
HEMOGLOBIN: 14.5 G/DL (ref 13.5–17.5)
LYMPHOCYTES ABSOLUTE: 1.19 /ΜL
LYMPHOCYTES RELATIVE PERCENT: 23.5 %
MCH RBC QN AUTO: 28 PG
MCHC RBC AUTO-ENTMCNC: 33.5 G/DL
MCV RBC AUTO: 83.8 FL
MONOCYTES ABSOLUTE: 0.53 /ΜL
MONOCYTES RELATIVE PERCENT: 10.5 %
NEUTROPHILS ABSOLUTE: 3.08 /ΜL
NEUTROPHILS RELATIVE PERCENT: 60.7 %
PDW BLD-RTO: 12 %
PLATELET # BLD: 177 K/ΜL
PMV BLD AUTO: 11.7 FL
POTASSIUM SERPL-SCNC: 4.5 MMOL/L
RBC # BLD: 5.17 10^6/ΜL
SEDIMENTATION RATE, ERYTHROCYTE: 11
SODIUM BLD-SCNC: 143 MMOL/L
TOTAL PROTEIN: 7
WBC # BLD: 5.1 10^3/ML

## 2023-03-13 ENCOUNTER — OFFICE VISIT (OUTPATIENT)
Dept: RHEUMATOLOGY | Age: 53
End: 2023-03-13
Payer: COMMERCIAL

## 2023-03-13 VITALS
DIASTOLIC BLOOD PRESSURE: 80 MMHG | WEIGHT: 279 LBS | OXYGEN SATURATION: 97 % | HEART RATE: 88 BPM | SYSTOLIC BLOOD PRESSURE: 150 MMHG | BODY MASS INDEX: 35.81 KG/M2 | HEIGHT: 74 IN

## 2023-03-13 DIAGNOSIS — M19.90 INFLAMMATORY ARTHRITIS: ICD-10-CM

## 2023-03-13 DIAGNOSIS — Z79.899 ENCOUNTER FOR LONG-TERM (CURRENT) USE OF HIGH-RISK MEDICATION: ICD-10-CM

## 2023-03-13 PROCEDURE — 99214 OFFICE O/P EST MOD 30 MIN: CPT | Performed by: INTERNAL MEDICINE

## 2023-03-13 RX ORDER — HYDROXYCHLOROQUINE SULFATE 200 MG/1
200 TABLET, FILM COATED ORAL 2 TIMES DAILY
Qty: 60 TABLET | Refills: 1 | Status: SHIPPED | OUTPATIENT
Start: 2023-03-13 | End: 2023-04-12

## 2023-03-13 RX ORDER — PREDNISONE 1 MG/1
5 TABLET ORAL DAILY
Qty: 30 TABLET | Refills: 2 | Status: SHIPPED | OUTPATIENT
Start: 2023-03-13 | End: 2023-04-12

## 2023-03-13 RX ORDER — LEFLUNOMIDE 20 MG/1
20 TABLET ORAL DAILY
Qty: 30 TABLET | Refills: 2 | Status: SHIPPED | OUTPATIENT
Start: 2023-03-13 | End: 2023-04-12

## 2023-03-13 ASSESSMENT — ENCOUNTER SYMPTOMS
ABDOMINAL PAIN: 0
COUGH: 0
SHORTNESS OF BREATH: 0
NAUSEA: 0
VOMITING: 0

## 2023-03-13 NOTE — PROGRESS NOTES
[facility]) Physicians   Rheumatology Clinic Note      3/13/2023       CHIEF COMPLAINT:    Chief Complaint   Patient presents with    Follow-up     2 mnth f/u Inflammatory arthritis    Pt stated pain 1/10   Discomfort in hands            HISTORY OF PRESENT ILLNESS:    47 y.o. male with suspected inflammatory arthritis (?seronegative RA) presents for follow up. He is presently on leflunomide 20 mg daily and prednisone 5 mg bid. Past med: methotrexate (ineffective)    Reports that he decreased prednsione to 5 mg daily (from 10 mg daily) around 6 weeks ago. Reports having pain in his hand. Mainly in the PIP's joints. Achy non-radiating, 1-2/10 in pain scale. Episodes of joint swelling. Morning stiffness lasts for 30 minutes. Otherwise doing well. RECAP:  Summer 2022, started abruptly with dull ache in both shoulders. Right is worse than left. This as progressively worsen. Associated with stiffness. Has difficulty raising his arms without assistance. Was prescribed prednisone 15 mg, which helped a lot. Then symtpoms worsen and progressed to involve his hips and knees, so prednsione dose was increased to 20 mg daily, which again helped. Father has connective tissue disorder with ILD. Past Medical History:     has no past medical history on file. Past Surgical History:     has a past surgical history that includes Lumbar disc surgery (2003) and Lumbar disc surgery (1997).     Current Medications:      Current Outpatient Medications:     predniSONE (DELTASONE) 5 MG tablet, Take 1 tablet by mouth daily, Disp: 30 tablet, Rfl: 2    leflunomide (ARAVA) 20 MG tablet, Take 1 tablet by mouth daily, Disp: 30 tablet, Rfl: 2    hydroxychloroquine (PLAQUENIL) 200 MG tablet, Take 1 tablet by mouth 2 times daily, Disp: 60 tablet, Rfl: 1    ibuprofen (ADVIL;MOTRIN) 200 MG tablet, Take 200 mg by mouth every 6 hours as needed, Disp: , Rfl:     olmesartan (BENICAR) 40 MG tablet, Take 40 mg by mouth daily, Disp: , Rfl:     turmeric 500 MG CAPS, Take 500 mg by mouth daily, Disp: , Rfl:     Allergies:    Patient has no known allergies. Social History:     reports that he has never smoked. His smokeless tobacco use includes chew. Family History:   family history includes Arthritis in his father; Neurodegenerative disease  in his father; Parkinson's Disease in his father. REVIEW OF SYSTEMS:    Review of Systems   Constitutional:  Negative for chills and fever. Respiratory:  Negative for cough and shortness of breath. Cardiovascular:  Negative for chest pain. Gastrointestinal:  Negative for abdominal pain, nausea and vomiting. Skin:  Negative for rash. PHYSICAL EXAM:    Vitals:    BP (!) 150/80 (Site: Left Upper Arm, Position: Sitting, Cuff Size: Large Adult)   Pulse 88   Ht 6' 2\" (1.88 m)   Wt 279 lb (126.6 kg)   SpO2 97%   BMI 35.82 kg/m²     Physical Exam  Constitutional:       General: He is not in acute distress. Appearance: Normal appearance. Eyes:      Conjunctiva/sclera: Conjunctivae normal.   Pulmonary:      Effort: Pulmonary effort is normal.   Musculoskeletal:         General: No swelling, tenderness or deformity. Normal range of motion. Cervical back: Neck supple. Skin:     General: Skin is warm and dry. Findings: No rash. Neurological:      General: No focal deficit present. Mental Status: He is alert and oriented to person, place, and time.       Gait: Gait normal.   Psychiatric:         Mood and Affect: Mood normal.          DATA:     Latest Reference Range & Units 3/7/23 00:00   Sodium mmol/L 143 (E)   Potassium mmol/L 4.5 (E)   Chloride mmol/L 106 (E)   CO2 mmol/L 27 (E)   BUN,BUNPL mg/dL 24 (E)   Creatinine  0.86 (E)   Anion Gap mmol/L 10.0 (E)   Glucose, Random mg/dL 94 (E)   CALCIUM, SERUM, 830631 mg/dL 9.6 (E)   Total Protein  7.0 (E)   Est, Glomerular Filtration Rate  104 (E)      3/7/23 00:00   CRP 0.3 (E)      Latest Reference Range & Units 3/7/23 00:00   Albumin  4.6 (E)   Alk Phos U/L 54 (E)   ALT U/L 33 (E)   AST U/L 25 (E)   BILIRUBIN TOTAL 0.1 - 1.4 mg/dL 0.6 (E)   Total Protein  7.0 (E)      Latest Reference Range & Units 3/7/23 00:00   WBC 10^3/mL 5.1 (E)   RBC 10^6/µL 5.17 (E)   Hemoglobin Quant 13.5 - 17.5 g/dL 14.5 (E)   Hematocrit 41 - 53 % 43.3 (E)   MCV fL 83.8 (E)   MCH pg 28.0 (E)   MCHC g/dL 33.5 (E)   MPV fL 11.7 (E)   RDW % 12.0 (E)   Platelet Count K/µL 843 (E)      3/7/23 00:00   Sed Rate 11 (E)       RAPID3 Composite Score = MDHAQ (0-10) + Patient pain VAS (0-10): + Patient global assessment VAS (0-10):     3/13/2023 --- RAPID 3: 0 + 0.5 + 0.5 = 1     Remission: <3  Low Disease Activity: <6  Moderate Disease Activity: >=6 and <=12  High Disease Activity: >12      IMPRESSION/RECOMMENDATIONS:      1. Suspect inflammatory arthritis (seronegative RA). Stable and improved with current medication regimen. -- trial of hydroxychloroquine 200 mg bid. Discussed with pt the benefits, risks and adverse effects of this medication. Patient expressed understanding.  -- continue leflunomide 20 mg daily. -- continue prednisone 5 mg once daily for now. Consider tapering down next visit after evaluating Plaquenil effects. 2. High risk medication requiring close monitoring:  -- LEF: monitor CBC and CMP every 8-12 weeks. Reviewed with pt his lab results.     RTC in 2 months      Orders Placed This Encounter   Procedures    CBC with Auto Differential     Standing Status:   Future     Standing Expiration Date:   9/13/2023    Comprehensive Metabolic Panel     Standing Status:   Future     Standing Expiration Date:   9/13/2023    C-Reactive Protein     Standing Status:   Future     Standing Expiration Date:   9/13/2023    Sedimentation Rate     Standing Status:   Future     Standing Expiration Date:   9/13/2023            Bandar Garcia MD    [address] [facility]  [address] [facility]. [address]  [phone]

## 2023-07-10 DIAGNOSIS — M19.90 INFLAMMATORY ARTHRITIS: ICD-10-CM

## 2023-07-10 DIAGNOSIS — Z79.899 ENCOUNTER FOR LONG-TERM (CURRENT) USE OF HIGH-RISK MEDICATION: ICD-10-CM

## 2023-07-10 RX ORDER — LEFLUNOMIDE 20 MG/1
20 TABLET ORAL DAILY
Qty: 30 TABLET | Refills: 2 | OUTPATIENT
Start: 2023-07-10 | End: 2023-08-09

## 2023-07-10 NOTE — TELEPHONE ENCOUNTER
Chely Aviles called requesting a refill on the following medications:  Requested Prescriptions     Pending Prescriptions Disp Refills    leflunomide (ARAVA) 20 MG tablet 30 tablet 2     Sig: Take 1 tablet by mouth daily     Pharmacy verified:  .pv  [address]      Date of last visit:   Date of next visit (if applicable): 8/56/5224      *patient is leaving to go to [facility] in 2 wks, on 07/24/2023 through mid august and would like to get refilled before he leaves, because he will run out while he is gone.

## 2023-07-11 LAB
ABSOLUTE BASO #: 0.08 K/UL (ref 0–0.2)
ABSOLUTE EOS #: 0.22 K/UL (ref 0–0.5)
ABSOLUTE LYMPH #: 1.19 K/UL (ref 1–4)
ABSOLUTE MONO #: 0.56 K/UL (ref 0.2–1)
ABSOLUTE NEUT #: 2.5 K/UL (ref 1.5–7.5)
ALBUMIN SERPL-MCNC: 4.6 G/DL (ref 3.5–5.2)
ALK PHOSPHATASE: 58 U/L (ref 40–121)
ALT SERPL-CCNC: 31 U/L (ref 5–50)
ANION GAP SERPL CALCULATED.3IONS-SCNC: 12 MEQ/L (ref 7–16)
AST SERPL-CCNC: 23 U/L (ref 9–50)
BASOPHILS RELATIVE PERCENT: 1.7 %
BILIRUB SERPL-MCNC: 0.6 MG/DL
BUN BLDV-MCNC: 26 MG/DL (ref 6–20)
C-REACTIVE PROTEIN: 0.3 MG/DL
CALCIUM SERPL-MCNC: 9.6 MG/DL (ref 8.5–10.5)
CHLORIDE BLD-SCNC: 104 MEQ/L (ref 95–107)
CO2: 24 MEQ/L (ref 19–31)
CREAT SERPL-MCNC: 0.86 MG/DL (ref 0.8–1.4)
EGFR IF NONAFRICAN AMERICAN: 104 ML/MIN/1.73
EOSINOPHILS RELATIVE PERCENT: 4.8 %
GLUCOSE: 89 MG/DL (ref 70–99)
HCT VFR BLD CALC: 41.2 % (ref 40–51)
HEMOGLOBIN: 14.2 G/DL (ref 13.5–17)
LYMPHOCYTE %: 26 %
MCH RBC QN AUTO: 28.4 PG (ref 25–33)
MCHC RBC AUTO-ENTMCNC: 34.5 G/DL (ref 31–36)
MCV RBC AUTO: 82.4 FL (ref 80–99)
MONOCYTES # BLD: 12.2 %
NEUTROPHILS RELATIVE PERCENT: 54.6 %
PDW BLD-RTO: 13 % (ref 11.5–15)
PLATELETS: 204 K/UL (ref 130–400)
PMV BLD AUTO: 11.3 FL (ref 9.3–13)
POTASSIUM SERPL-SCNC: 4.3 MEQ/L (ref 3.5–5.4)
RBC: 5 M/UL (ref 4.5–6.1)
SEDIMENTATION RATE, ERYTHROCYTE: 15 MM/HR (ref 0–15)
SODIUM BLD-SCNC: 140 MEQ/L (ref 133–146)
TOTAL PROTEIN: 6.9 G/DL (ref 6.1–8.3)
WBC: 4.6 K/UL (ref 3.5–11)

## 2023-07-17 DIAGNOSIS — M19.90 INFLAMMATORY ARTHRITIS: ICD-10-CM

## 2023-07-17 DIAGNOSIS — Z79.899 ENCOUNTER FOR LONG-TERM (CURRENT) USE OF HIGH-RISK MEDICATION: ICD-10-CM

## 2023-07-17 RX ORDER — PREDNISONE 5 MG/1
5 TABLET ORAL DAILY
Qty: 30 TABLET | Refills: 1 | Status: SHIPPED | OUTPATIENT
Start: 2023-07-17 | End: 2023-09-15

## 2023-07-17 RX ORDER — LEFLUNOMIDE 20 MG/1
20 TABLET ORAL DAILY
Qty: 30 TABLET | Refills: 1 | Status: SHIPPED | OUTPATIENT
Start: 2023-07-17 | End: 2023-09-15

## 2023-07-17 NOTE — TELEPHONE ENCOUNTER
DOLV: 3/13/23  DONV: 8/21/23  LAST LAB DRAW: 7/11/23  LAST TB TEST: 1/3/23    Lab Results   Component Value Date     07/11/2023    K 4.3 07/11/2023     07/11/2023    CO2 24 07/11/2023    BUN 26 (H) 07/11/2023    CREATININE 0.86 07/11/2023    GLUCOSE 89 07/11/2023    CALCIUM 9.6 07/11/2023    PROT 6.9 07/11/2023    LABALBU 4.6 07/11/2023    BILITOT 0.6 07/11/2023    ALKPHOS 58 07/11/2023    AST 23 07/11/2023    ALT 31 07/11/2023    LABGLOM 104 03/07/2023       Recent Labs     07/11/23  0711   WBC 4.6   HGB 14.2   HCT 41.2   MCV 82.4          Lab Results   Component Value Date    SEDRATE 15 07/11/2023       Lab Results   Component Value Date    CRP 0.3 07/11/2023

## 2023-08-21 ENCOUNTER — OFFICE VISIT (OUTPATIENT)
Dept: RHEUMATOLOGY | Age: 53
End: 2023-08-21
Payer: COMMERCIAL

## 2023-08-21 VITALS
BODY MASS INDEX: 36.45 KG/M2 | HEART RATE: 74 BPM | DIASTOLIC BLOOD PRESSURE: 70 MMHG | OXYGEN SATURATION: 96 % | WEIGHT: 284 LBS | SYSTOLIC BLOOD PRESSURE: 128 MMHG | HEIGHT: 74 IN

## 2023-08-21 DIAGNOSIS — M19.90 INFLAMMATORY ARTHRITIS: Primary | ICD-10-CM

## 2023-08-21 DIAGNOSIS — Z79.899 ENCOUNTER FOR LONG-TERM (CURRENT) USE OF HIGH-RISK MEDICATION: ICD-10-CM

## 2023-08-21 PROCEDURE — 99214 OFFICE O/P EST MOD 30 MIN: CPT | Performed by: INTERNAL MEDICINE

## 2023-08-21 ASSESSMENT — ENCOUNTER SYMPTOMS
ABDOMINAL PAIN: 0
COUGH: 0
SHORTNESS OF BREATH: 0
NAUSEA: 0
VOMITING: 0

## 2023-08-21 NOTE — PROGRESS NOTES
[facility]) Physicians   Rheumatology Clinic Note      8/21/2023       CHIEF COMPLAINT:    Chief Complaint   Patient presents with    Follow-up     2 month f/u Inflammatory arthritis    Other     Pt is tolerating well. Pt  is not take plaquenil due to side effects. No pain . HISTORY OF PRESENT ILLNESS:    47 y.o. male with suspected inflammatory arthritis (?seronegative RA) presents for follow up. He is presently on leflunomide 20 mg daily and prednisone 5 mg daily. Past med: methotrexate (ineffective), hydroxychloroquine (GI side effects). Overall is doing well. Hydroxychloroquine was prescribed last visit. Did not tolerate it due to GI side effects. Reports that he is not having any joint pain, joint swelling or prolonged morning stiffness. RECAP:  Summer 2022, started abruptly with dull ache in both shoulders. Right is worse than left. This as progressively worsen. Associated with stiffness. Has difficulty raising his arms without assistance. Was prescribed prednisone 15 mg, which helped a lot. Then symtpoms worsen and progressed to involve his hips and knees, so prednsione dose was increased to 20 mg daily, which again helped. Father has connective tissue disorder with ILD. Past Medical History:     has no past medical history on file. Past Surgical History:     has a past surgical history that includes Lumbar disc surgery (2003) and Lumbar disc surgery (1997).     Current Medications:      Current Outpatient Medications:     leflunomide (ARAVA) 20 MG tablet, Take 1 tablet by mouth daily, Disp: 30 tablet, Rfl: 1    predniSONE (DELTASONE) 5 MG tablet, Take 1 tablet by mouth daily, Disp: 30 tablet, Rfl: 1    ibuprofen (ADVIL;MOTRIN) 200 MG tablet, Take 1 tablet by mouth every 6 hours as needed, Disp: , Rfl:     olmesartan (BENICAR) 40 MG tablet, Take 1 tablet by mouth daily, Disp: , Rfl:     hydroxychloroquine (PLAQUENIL) 200 MG tablet, Take 1 tablet by mouth 2 times daily,

## 2023-09-15 DIAGNOSIS — M19.90 INFLAMMATORY ARTHRITIS: ICD-10-CM

## 2023-09-15 DIAGNOSIS — Z79.899 ENCOUNTER FOR LONG-TERM (CURRENT) USE OF HIGH-RISK MEDICATION: ICD-10-CM

## 2023-09-17 RX ORDER — PREDNISONE 5 MG/1
5 TABLET ORAL DAILY
Qty: 30 TABLET | Refills: 2 | Status: SHIPPED | OUTPATIENT
Start: 2023-09-17 | End: 2023-12-16

## 2023-09-17 RX ORDER — LEFLUNOMIDE 20 MG/1
20 TABLET ORAL DAILY
Qty: 30 TABLET | Refills: 2 | Status: SHIPPED | OUTPATIENT
Start: 2023-09-17 | End: 2023-12-16

## 2023-09-23 DIAGNOSIS — M19.90 INFLAMMATORY ARTHRITIS: ICD-10-CM

## 2023-09-23 DIAGNOSIS — Z79.899 ENCOUNTER FOR LONG-TERM (CURRENT) USE OF HIGH-RISK MEDICATION: ICD-10-CM

## 2023-09-25 RX ORDER — PREDNISONE 5 MG/1
5 TABLET ORAL DAILY
Qty: 30 TABLET | Refills: 2 | OUTPATIENT
Start: 2023-09-25 | End: 2023-12-24

## 2023-09-25 RX ORDER — LEFLUNOMIDE 20 MG/1
20 TABLET ORAL DAILY
Qty: 30 TABLET | Refills: 2 | OUTPATIENT
Start: 2023-09-25 | End: 2023-12-24

## 2023-11-16 LAB
ABSOLUTE BASO #: 0.08 K/UL (ref 0–0.2)
ABSOLUTE EOS #: 0.39 K/UL (ref 0–0.5)
ABSOLUTE LYMPH #: 1.27 K/UL (ref 1–4)
ABSOLUTE MONO #: 0.47 K/UL (ref 0.2–1)
ABSOLUTE NEUT #: 1.87 K/UL (ref 1.5–7.5)
ALBUMIN SERPL-MCNC: 4.5 G/DL (ref 3.5–5.2)
ALK PHOSPHATASE: 57 U/L (ref 40–121)
ALT SERPL-CCNC: 25 U/L (ref 5–50)
ANION GAP SERPL CALCULATED.3IONS-SCNC: 11 MEQ/L (ref 7–16)
AST SERPL-CCNC: 17 U/L (ref 9–50)
BASOPHILS RELATIVE PERCENT: 2 %
BILIRUB SERPL-MCNC: 0.6 MG/DL
BUN BLDV-MCNC: 20 MG/DL (ref 6–20)
C-REACTIVE PROTEIN: 0.3 MG/DL
CALCIUM SERPL-MCNC: 9.7 MG/DL (ref 8.5–10.5)
CHLORIDE BLD-SCNC: 102 MEQ/L (ref 95–107)
CO2: 27 MEQ/L (ref 19–31)
CREAT SERPL-MCNC: 0.94 MG/DL (ref 0.8–1.4)
EGFR IF NONAFRICAN AMERICAN: 98 ML/MIN/1.73
EOSINOPHILS RELATIVE PERCENT: 9.5 %
GLUCOSE: 91 MG/DL (ref 70–99)
HCT VFR BLD CALC: 42.4 % (ref 40–51)
HEMOGLOBIN: 14.3 G/DL (ref 13.5–17)
LYMPHOCYTE %: 31.1 %
MCH RBC QN AUTO: 28 PG (ref 25–33)
MCHC RBC AUTO-ENTMCNC: 33.7 G/DL (ref 31–36)
MCV RBC AUTO: 83 FL (ref 80–99)
MONOCYTES # BLD: 11.5 %
NEUTROPHILS RELATIVE PERCENT: 45.7 %
PDW BLD-RTO: 12.5 % (ref 11.5–15)
PLATELETS: 158 K/UL (ref 130–400)
PMV BLD AUTO: 11.7 FL (ref 9.3–13)
POTASSIUM SERPL-SCNC: 4 MEQ/L (ref 3.5–5.4)
RBC: 5.11 M/UL (ref 4.5–6.1)
SEDIMENTATION RATE, ERYTHROCYTE: 12 MM/HR (ref 0–15)
SODIUM BLD-SCNC: 140 MEQ/L (ref 133–146)
TOTAL PROTEIN: 6.5 G/DL (ref 6.1–8.3)
WBC: 4.1 K/UL (ref 3.5–11)

## 2023-11-20 ENCOUNTER — OFFICE VISIT (OUTPATIENT)
Dept: RHEUMATOLOGY | Age: 53
End: 2023-11-20
Payer: COMMERCIAL

## 2023-11-20 VITALS
WEIGHT: 284 LBS | HEIGHT: 74 IN | OXYGEN SATURATION: 97 % | BODY MASS INDEX: 36.45 KG/M2 | DIASTOLIC BLOOD PRESSURE: 70 MMHG | SYSTOLIC BLOOD PRESSURE: 126 MMHG | HEART RATE: 86 BPM

## 2023-11-20 DIAGNOSIS — M06.00 SERONEGATIVE RHEUMATOID ARTHRITIS (HCC): Primary | ICD-10-CM

## 2023-11-20 DIAGNOSIS — Z79.52 CURRENT CHRONIC USE OF SYSTEMIC STEROIDS: ICD-10-CM

## 2023-11-20 DIAGNOSIS — Z79.899 ENCOUNTER FOR LONG-TERM (CURRENT) USE OF HIGH-RISK MEDICATION: ICD-10-CM

## 2023-11-20 PROCEDURE — 99214 OFFICE O/P EST MOD 30 MIN: CPT | Performed by: INTERNAL MEDICINE

## 2023-11-20 RX ORDER — LEFLUNOMIDE 20 MG/1
20 TABLET ORAL DAILY
Qty: 90 TABLET | Refills: 0 | Status: SHIPPED | OUTPATIENT
Start: 2023-11-20 | End: 2024-02-18

## 2023-11-20 RX ORDER — LEVOTHYROXINE SODIUM 0.03 MG/1
25 TABLET ORAL DAILY
COMMUNITY
Start: 2023-11-01

## 2023-11-20 RX ORDER — PREDNISONE 2.5 MG/1
5 TABLET ORAL DAILY
Qty: 180 TABLET | Refills: 0 | Status: SHIPPED | OUTPATIENT
Start: 2023-11-20 | End: 2024-02-18

## 2023-11-20 ASSESSMENT — ENCOUNTER SYMPTOMS
SHORTNESS OF BREATH: 0
NAUSEA: 0
COUGH: 0
VOMITING: 0
ABDOMINAL PAIN: 0

## 2023-11-20 NOTE — PROGRESS NOTES
[facility]) Physicians   Rheumatology Clinic Note      11/20/2023       CHIEF COMPLAINT:    Chief Complaint   Patient presents with    3 Month Follow-Up     Inflammatory arthritis    Pt tolerating well           HISTORY OF PRESENT ILLNESS:    47 y.o. male with suspected inflammatory arthritis (?seronegative RA) presents for follow up. He is presently on leflunomide 20 mg daily and prednisone 5 mg daily. Past med: methotrexate (ineffective), hydroxychloroquine (GI side effects). Overall is doing well. Has mild tolerable achiness in his fingers, rates it at 1-2/10 in pain scale. No prolonged morning stiffness. No significant joint swelling. RECAP:  Summer 2022, started abruptly with dull ache in both shoulders. Right is worse than left. This as progressively worsen. Associated with stiffness. Has difficulty raising his arms without assistance. Was prescribed prednisone 15 mg, which helped a lot. Then symtpoms worsen and progressed to involve his hips and knees, so prednsione dose was increased to 20 mg daily, which again helped. Father has connective tissue disorder with ILD. Past Medical History:     has a past medical history of Hypothyroidism. Past Surgical History:     has a past surgical history that includes Lumbar disc surgery (2003) and Lumbar disc surgery (1997). Current Medications:      Current Outpatient Medications:     leflunomide (ARAVA) 20 MG tablet, Take 1 tablet by mouth daily, Disp: 90 tablet, Rfl: 0    predniSONE (DELTASONE) 2.5 MG tablet, Take 2 tablets by mouth daily, Disp: 180 tablet, Rfl: 0    levothyroxine (SYNTHROID) 25 MCG tablet, Take 1 tablet by mouth daily, Disp: , Rfl:     ibuprofen (ADVIL;MOTRIN) 200 MG tablet, Take 1 tablet by mouth every 6 hours as needed, Disp: , Rfl:     olmesartan (BENICAR) 40 MG tablet, Take 1 tablet by mouth daily, Disp: , Rfl:     Allergies:    Patient has no known allergies. Social History:     reports that he has never smoked.

## 2023-12-29 DIAGNOSIS — M06.00 SERONEGATIVE RHEUMATOID ARTHRITIS (HCC): ICD-10-CM

## 2023-12-29 DIAGNOSIS — Z79.899 ENCOUNTER FOR LONG-TERM (CURRENT) USE OF HIGH-RISK MEDICATION: ICD-10-CM

## 2023-12-29 NOTE — TELEPHONE ENCOUNTER
DOLV: 11/20/23  DONV: 2/19/24  LAST LAB DRAW: 11/16/23  LAST TB TEST: 1/3/23    Lab Results   Component Value Date     11/16/2023    K 4.0 11/16/2023     11/16/2023    CO2 27 11/16/2023    BUN 20 11/16/2023    CREATININE 0.94 11/16/2023    GLUCOSE 91 11/16/2023    CALCIUM 9.7 11/16/2023    PROT 6.5 11/16/2023    LABALBU 4.5 11/16/2023    BILITOT 0.6 11/16/2023    ALKPHOS 57 11/16/2023    AST 17 11/16/2023    ALT 25 11/16/2023    LABGLOM 104 03/07/2023       No results for input(s): \"WBC\", \"HGB\", \"HCT\", \"MCV\", \"PLT\" in the last 720 hours.     Lab Results   Component Value Date    SEDRATE 12 11/16/2023       Lab Results   Component Value Date    CRP 0.3 11/16/2023

## 2023-12-30 RX ORDER — LEFLUNOMIDE 20 MG/1
20 TABLET ORAL DAILY
Qty: 90 TABLET | Refills: 0 | Status: SHIPPED | OUTPATIENT
Start: 2023-12-30 | End: 2024-03-29

## 2024-02-19 ENCOUNTER — OFFICE VISIT (OUTPATIENT)
Dept: RHEUMATOLOGY | Age: 54
End: 2024-02-19
Payer: COMMERCIAL

## 2024-02-19 VITALS
WEIGHT: 282.6 LBS | HEART RATE: 59 BPM | BODY MASS INDEX: 36.27 KG/M2 | SYSTOLIC BLOOD PRESSURE: 124 MMHG | HEIGHT: 74 IN | DIASTOLIC BLOOD PRESSURE: 74 MMHG | OXYGEN SATURATION: 96 %

## 2024-02-19 DIAGNOSIS — Z79.899 ENCOUNTER FOR LONG-TERM (CURRENT) USE OF HIGH-RISK MEDICATION: ICD-10-CM

## 2024-02-19 DIAGNOSIS — M06.00 SERONEGATIVE RHEUMATOID ARTHRITIS (HCC): Primary | ICD-10-CM

## 2024-02-19 LAB
ALBUMIN SERPL-MCNC: 4.6 G/DL (ref 3.5–5.2)
ALK PHOSPHATASE: 51 U/L (ref 40–121)
ALT SERPL-CCNC: 35 U/L (ref 5–50)
ANION GAP SERPL CALCULATED.3IONS-SCNC: 10 MEQ/L (ref 7–16)
AST SERPL-CCNC: 19 U/L (ref 9–50)
BILIRUB SERPL-MCNC: 0.8 MG/DL
BUN BLDV-MCNC: 15 MG/DL (ref 6–20)
C-REACTIVE PROTEIN: <0.3 MG/DL
CALCIUM SERPL-MCNC: 9.4 MG/DL (ref 8.5–10.5)
CHLORIDE BLD-SCNC: 102 MEQ/L (ref 95–107)
CO2: 28 MEQ/L (ref 19–31)
CREAT SERPL-MCNC: 0.73 MG/DL (ref 0.8–1.4)
EGFR IF NONAFRICAN AMERICAN: 109 ML/MIN/1.73
GLUCOSE: 86 MG/DL (ref 70–99)
POTASSIUM SERPL-SCNC: 4 MEQ/L (ref 3.5–5.4)
SEDIMENTATION RATE, ERYTHROCYTE: 15 MM/HR (ref 0–15)
SODIUM BLD-SCNC: 140 MEQ/L (ref 133–146)
TOTAL PROTEIN: 6.6 G/DL (ref 6.1–8.3)

## 2024-02-19 PROCEDURE — 99214 OFFICE O/P EST MOD 30 MIN: CPT | Performed by: INTERNAL MEDICINE

## 2024-02-19 RX ORDER — ALBUTEROL SULFATE 90 UG/1
AEROSOL, METERED RESPIRATORY (INHALATION)
COMMUNITY
Start: 2024-02-09

## 2024-02-19 ASSESSMENT — ENCOUNTER SYMPTOMS
VOMITING: 0
ABDOMINAL PAIN: 0
COUGH: 0
NAUSEA: 0
SHORTNESS OF BREATH: 0

## 2024-02-19 NOTE — PROGRESS NOTES
[facility] Physicians   Rheumatology Clinic Note      2/19/2024       CHIEF COMPLAINT:    Chief Complaint   Patient presents with    3 Month Follow-Up     Seronegative rheumatoid arthritis (HCC)    Other     Pt is tolerating well. No pain           HISTORY OF PRESENT ILLNESS:    53 y.o. male with suspected inflammatory arthritis (?seronegative RA) presents for follow up. He is presently on leflunomide 20 mg daily and prednisone 2.5 mg daily (decreased last visit).    Past med: methotrexate (ineffective), hydroxychloroquine (GI side effects).    Overall is doing well. No joint pain, joint swelling or prolonged morning stiffness.  Reports being hospitalized at [facility] 2 weeks ago for bronchitis and low oxygenation. States that CXR and CT chest were normal. After receiving few IV steroid treatments and breathing treatments his condition improved.       RECAP:  Summer 2022, started abruptly with dull ache in both shoulders. Right is worse than left. This as progressively worsen. Associated with stiffness. Has difficulty raising his arms without assistance. Was prescribed prednisone 15 mg, which helped a lot.   Then symtpoms worsen and progressed to involve his hips and knees, so prednsione dose was increased to 20 mg daily, which again helped.      Past Medical History:     has a past medical history of Hypothyroidism.    Past Surgical History:     has a past surgical history that includes Lumbar disc surgery (2003) and Lumbar disc surgery (1997).    Current Medications:      Current Outpatient Medications:     albuterol sulfate HFA (PROVENTIL;VENTOLIN;PROAIR) 108 (90 Base) MCG/ACT inhaler, , Disp: , Rfl:     leflunomide (ARAVA) 20 MG tablet, Take 1 tablet by mouth daily, Disp: 90 tablet, Rfl: 0    levothyroxine (SYNTHROID) 25 MCG tablet, Take 2 tablets by mouth daily, Disp: , Rfl:     ibuprofen (ADVIL;MOTRIN) 200 MG tablet, Take 1 tablet by mouth every 6 hours as needed, Disp: , Rfl:     olmesartan (BENICAR)

## 2024-02-20 LAB
ABSOLUTE BASO #: 0.09 K/UL (ref 0–0.2)
ABSOLUTE EOS #: 0.3 K/UL (ref 0–0.5)
ABSOLUTE LYMPH #: 1.22 K/UL (ref 1–4)
ABSOLUTE MONO #: 0.79 K/UL (ref 0.2–1)
ABSOLUTE NEUT #: 3.8 K/UL (ref 1.5–7.5)
BASOPHILS RELATIVE PERCENT: 1.4 %
EOSINOPHILS RELATIVE PERCENT: 4.8 %
HCT VFR BLD CALC: 42.5 % (ref 40–51)
HEMOGLOBIN: 14.3 G/DL (ref 13.5–17)
LYMPHOCYTE %: 19.6 %
MCH RBC QN AUTO: 28.1 PG (ref 25–33)
MCHC RBC AUTO-ENTMCNC: 33.6 G/DL (ref 31–36)
MCV RBC AUTO: 83.5 FL (ref 80–99)
MONOCYTES # BLD: 12.7 %
NEUTROPHILS RELATIVE PERCENT: 61.2 %
PDW BLD-RTO: 13 % (ref 11.5–15)
PLATELETS: 193 K/UL (ref 130–400)
PMV BLD AUTO: 11.6 FL (ref 9.3–13)
RBC: 5.09 M/UL (ref 4.5–6.1)
WBC: 6.2 K/UL (ref 3.5–11)

## 2024-03-26 DIAGNOSIS — M06.00 SERONEGATIVE RHEUMATOID ARTHRITIS (HCC): ICD-10-CM

## 2024-03-26 DIAGNOSIS — Z79.899 ENCOUNTER FOR LONG-TERM (CURRENT) USE OF HIGH-RISK MEDICATION: ICD-10-CM

## 2024-03-26 RX ORDER — LEFLUNOMIDE 20 MG/1
20 TABLET ORAL DAILY
Qty: 90 TABLET | Refills: 0 | Status: SHIPPED | OUTPATIENT
Start: 2024-03-26 | End: 2024-06-24

## 2024-03-26 NOTE — TELEPHONE ENCOUNTER
DOLV: 2/19/24  DONV: 5/20/24  LAST LAB DRAW: 2/29/24  LAST TB TEST: 1/3/23    Lab Results   Component Value Date     02/19/2024    K 4.0 02/19/2024     02/19/2024    CO2 28 02/19/2024    BUN 15 02/19/2024    CREATININE 0.73 (L) 02/19/2024    GLUCOSE 86 02/19/2024    CALCIUM 9.4 02/19/2024    PROT 6.6 02/19/2024    LABALBU 4.6 02/19/2024    BILITOT 0.8 02/19/2024    ALKPHOS 51 02/19/2024    AST 19 02/19/2024    ALT 35 02/19/2024    LABGLOM 104 03/07/2023       No results for input(s): \"WBC\", \"HGB\", \"HCT\", \"MCV\", \"PLT\" in the last 720 hours.    Lab Results   Component Value Date    SEDRATE 15 02/19/2024       Lab Results   Component Value Date    CRP <0.3 02/19/2024

## 2024-04-05 ENCOUNTER — HOSPITAL ENCOUNTER (OUTPATIENT)
Dept: CT IMAGING | Age: 54
Discharge: HOME | End: 2024-04-05
Payer: COMMERCIAL

## 2024-04-05 DIAGNOSIS — E66.9 OBESITY, UNSPECIFIED CLASSIFICATION, UNSPECIFIED OBESITY TYPE, UNSPECIFIED WHETHER SERIOUS COMORBIDITY PRESENT: ICD-10-CM

## 2024-04-05 DIAGNOSIS — I10 HYPERTENSION COMPLICATIONS: ICD-10-CM

## 2024-04-05 PROCEDURE — 75571 CT HRT W/O DYE W/CA TEST: CPT

## 2024-05-20 ENCOUNTER — OFFICE VISIT (OUTPATIENT)
Dept: RHEUMATOLOGY | Age: 54
End: 2024-05-20
Payer: COMMERCIAL

## 2024-05-20 VITALS
HEART RATE: 78 BPM | DIASTOLIC BLOOD PRESSURE: 74 MMHG | SYSTOLIC BLOOD PRESSURE: 134 MMHG | OXYGEN SATURATION: 95 % | BODY MASS INDEX: 35.7 KG/M2 | HEIGHT: 74 IN | WEIGHT: 278.2 LBS

## 2024-05-20 DIAGNOSIS — M06.00 SERONEGATIVE RHEUMATOID ARTHRITIS (HCC): Primary | ICD-10-CM

## 2024-05-20 DIAGNOSIS — Z79.899 ENCOUNTER FOR LONG-TERM (CURRENT) USE OF HIGH-RISK MEDICATION: ICD-10-CM

## 2024-05-20 LAB
BASOPHILS ABSOLUTE: 0.07 K/UL (ref 0–0.2)
BASOPHILS RELATIVE PERCENT: 1.1 %
EOSINOPHILS ABSOLUTE: 0.16 K/UL (ref 0–0.5)
EOSINOPHILS RELATIVE PERCENT: 2.6 %
HCT VFR BLD CALC: 41.3 % (ref 40–51)
HEMOGLOBIN: 13.7 G/DL (ref 13.5–17)
IMMATURE GRANS (ABS): 0.02
IMMATURE GRANULOCYTES %: 0.3 %
LYMPHOCYTES ABSOLUTE: 1.36 K/UL (ref 1–4)
LYMPHOCYTES RELATIVE PERCENT: 22.1 %
MCH RBC QN AUTO: 27.8 PG (ref 25–33)
MCHC RBC AUTO-ENTMCNC: 33.2 G/DL (ref 31–36)
MCV RBC AUTO: 83.9 FL (ref 80–99)
MONOCYTES ABSOLUTE: 0.55 K/UL (ref 0.2–1)
MONOCYTES RELATIVE PERCENT: 9 %
NEUTROPHILS ABSOLUTE: 3.98 K/UL (ref 1.5–7.5)
NEUTROPHILS RELATIVE PERCENT: 64.9 %
PDW BLD-RTO: 12.8 % (ref 11.5–15)
PLATELET # BLD: 182 K/UL (ref 130–400)
PMV BLD AUTO: 11.5 FL (ref 9.3–13)
RBC # BLD: 4.92 M/UL (ref 4.5–6.1)
SED RATE, AUTOMATED: 13 MM/HR (ref 0–15)
WBC # BLD: 6.1 K/UL (ref 3.5–11)

## 2024-05-20 PROCEDURE — 99214 OFFICE O/P EST MOD 30 MIN: CPT | Performed by: INTERNAL MEDICINE

## 2024-05-20 RX ORDER — HYDROCHLOROTHIAZIDE 25 MG/1
12.5 TABLET ORAL DAILY
COMMUNITY
Start: 2024-03-28

## 2024-05-20 RX ORDER — PREDNISONE 2.5 MG/1
5 TABLET ORAL DAILY
Qty: 180 TABLET | Refills: 0 | Status: SHIPPED | OUTPATIENT
Start: 2024-05-20 | End: 2024-08-18

## 2024-05-20 RX ORDER — BUDESONIDE AND FORMOTEROL FUMARATE 160; 4.5 UG/1; UG/1
2 AEROSOL, METERED RESPIRATORY (INHALATION) 2 TIMES DAILY
COMMUNITY
Start: 2024-05-14

## 2024-05-20 RX ORDER — MONTELUKAST SODIUM 10 MG/1
10 TABLET ORAL DAILY
COMMUNITY

## 2024-05-20 ASSESSMENT — ENCOUNTER SYMPTOMS
ABDOMINAL PAIN: 0
NAUSEA: 0
SHORTNESS OF BREATH: 0
COUGH: 0
VOMITING: 0

## 2024-05-20 NOTE — PROGRESS NOTES
daily.   -- continue prednisone 2.5 mg once daily    2. High risk medication requiring close monitoring:  -- LEF: monitor CBC and CMP every 12 weeks. No recent labs. Obtain labs today    RTC in 6 months      Orders Placed This Encounter   Procedures    CBC with Auto Differential     Standing Status:   Future     Standing Expiration Date:   5/20/2025    Comprehensive Metabolic Panel     Standing Status:   Future     Standing Expiration Date:   5/20/2025    C-Reactive Protein     Standing Status:   Future     Standing Expiration Date:   5/20/2025    Sedimentation Rate     Standing Status:   Future     Standing Expiration Date:   5/20/2025    CBC with Auto Differential     Standing Status:   Future     Standing Expiration Date:   5/20/2025    Comprehensive Metabolic Panel     Standing Status:   Future     Standing Expiration Date:   5/20/2025    C-Reactive Protein     Standing Status:   Future     Standing Expiration Date:   5/20/2025    Sedimentation Rate     Standing Status:   Future     Standing Expiration Date:   5/20/2025    CBC with Auto Differential     Standing Status:   Future     Standing Expiration Date:   11/20/2024    Comprehensive Metabolic Panel     Standing Status:   Future     Standing Expiration Date:   11/20/2024    Sedimentation Rate     Standing Status:   Future     Standing Expiration Date:   11/20/2024            Eloisa Salinas MD    [facility] RHEUMATOLOGY  [address] [address] [phone]

## 2024-05-21 LAB
ALBUMIN: 4.4 G/DL (ref 3.5–5.2)
ALK PHOSPHATASE: 66 U/L (ref 40–121)
ALT SERPL-CCNC: 32 U/L (ref 5–50)
ANION GAP SERPL CALCULATED.3IONS-SCNC: 10 MEQ/L (ref 7–16)
AST SERPL-CCNC: 24 U/L (ref 9–50)
BILIRUB SERPL-MCNC: 0.5 MG/DL
BUN BLDV-MCNC: 15 MG/DL (ref 6–20)
C-REACTIVE PROTEIN: 0.3 MG/DL
CALCIUM SERPL-MCNC: 9.4 MG/DL (ref 8.5–10.5)
CHLORIDE BLD-SCNC: 101 MEQ/L (ref 95–107)
CO2: 27 MEQ/L (ref 19–31)
CREAT SERPL-MCNC: 0.9 MG/DL (ref 0.8–1.4)
EGFR IF NONAFRICAN AMERICAN: 102 ML/MIN/1.73
GLUCOSE: 120 MG/DL (ref 70–99)
POTASSIUM SERPL-SCNC: 3.9 MEQ/L (ref 3.5–5.4)
SODIUM BLD-SCNC: 138 MEQ/L (ref 133–146)
TOTAL PROTEIN: 6.8 G/DL (ref 6.1–8.3)

## 2024-07-12 DIAGNOSIS — M06.00 SERONEGATIVE RHEUMATOID ARTHRITIS (HCC): ICD-10-CM

## 2024-07-12 DIAGNOSIS — Z79.899 ENCOUNTER FOR LONG-TERM (CURRENT) USE OF HIGH-RISK MEDICATION: ICD-10-CM

## 2024-07-12 NOTE — TELEPHONE ENCOUNTER
DOLV: 5/20/24  DONV: 11/20/24  LAST LAB DRAW: 5/20/24  LAST TB TEST: 1/3/23    Lab Results   Component Value Date     05/20/2024    K 3.9 05/20/2024     05/20/2024    CO2 27 05/20/2024    BUN 15 05/20/2024    CREATININE 0.90 05/20/2024    GLUCOSE 120 (H) 05/20/2024    CALCIUM 9.4 05/20/2024    BILITOT 0.5 05/20/2024    ALKPHOS 66 05/20/2024    AST 24 05/20/2024    ALT 32 05/20/2024    LABGLOM 104 03/07/2023       No results for input(s): \"WBC\", \"HGB\", \"HCT\", \"MCV\", \"PLT\" in the last 720 hours.    Lab Results   Component Value Date    SEDRATE 13 05/20/2024       Lab Results   Component Value Date    CRP 0.3 05/20/2024

## 2024-07-15 RX ORDER — LEFLUNOMIDE 20 MG/1
20 TABLET ORAL DAILY
Qty: 90 TABLET | Refills: 0 | Status: SHIPPED | OUTPATIENT
Start: 2024-07-15 | End: 2024-10-13

## 2024-08-06 LAB
ALBUMIN: 4.4 G/DL (ref 3.5–5.2)
ALK PHOSPHATASE: 61 U/L (ref 40–121)
ALT SERPL-CCNC: 27 U/L (ref 5–50)
ANION GAP SERPL CALCULATED.3IONS-SCNC: 10 MEQ/L (ref 7–16)
AST SERPL-CCNC: 20 U/L (ref 9–50)
BASOPHILS ABSOLUTE: 0.1 K/UL (ref 0–0.2)
BASOPHILS RELATIVE PERCENT: 2 %
BILIRUB SERPL-MCNC: 0.8 MG/DL
BUN BLDV-MCNC: 18 MG/DL (ref 6–20)
C-REACTIVE PROTEIN: 0.3 MG/DL
CALCIUM SERPL-MCNC: 9.2 MG/DL (ref 8.5–10.5)
CHLORIDE BLD-SCNC: 101 MEQ/L (ref 95–107)
CO2: 26 MEQ/L (ref 19–31)
CREAT SERPL-MCNC: 1.01 MG/DL (ref 0.8–1.4)
EGFR IF NONAFRICAN AMERICAN: 89 ML/MIN/1.73
EOSINOPHILS ABSOLUTE: 0.39 K/UL (ref 0–0.5)
EOSINOPHILS RELATIVE PERCENT: 7.8 %
GLUCOSE: 135 MG/DL (ref 70–99)
HCT VFR BLD CALC: 42.3 % (ref 40–51)
HEMOGLOBIN: 13.7 G/DL (ref 13.5–17)
IMMATURE GRANS (ABS): 0.02
IMMATURE GRANULOCYTES %: 0.4 %
LYMPHOCYTES ABSOLUTE: 1.26 K/UL (ref 1–4)
LYMPHOCYTES RELATIVE PERCENT: 25.3 %
MCH RBC QN AUTO: 27.7 PG (ref 25–33)
MCHC RBC AUTO-ENTMCNC: 32.4 G/DL (ref 31–36)
MCV RBC AUTO: 85.6 FL (ref 80–99)
MONOCYTES ABSOLUTE: 0.46 K/UL (ref 0.2–1)
MONOCYTES RELATIVE PERCENT: 9.2 %
NEUTROPHILS ABSOLUTE: 2.76 K/UL (ref 1.5–7.5)
PDW BLD-RTO: 12.5 % (ref 11.5–15)
PLATELET # BLD: 181 K/UL (ref 130–400)
PMV BLD AUTO: 11.5 FL (ref 9.3–13)
POTASSIUM SERPL-SCNC: 4 MEQ/L (ref 3.5–5.4)
RBC # BLD: 4.94 M/UL (ref 4.5–6.1)
SED RATE, AUTOMATED: 20 MM/HR (ref 0–15)
SODIUM BLD-SCNC: 137 MEQ/L (ref 133–146)
TOTAL PROTEIN: 6.7 G/DL (ref 6.1–8.3)
WBC # BLD: 5 K/UL (ref 3.5–11)

## 2024-10-14 DIAGNOSIS — Z79.899 ENCOUNTER FOR LONG-TERM (CURRENT) USE OF HIGH-RISK MEDICATION: ICD-10-CM

## 2024-10-14 DIAGNOSIS — M06.00 SERONEGATIVE RHEUMATOID ARTHRITIS (HCC): ICD-10-CM

## 2024-10-14 RX ORDER — LEFLUNOMIDE 20 MG/1
20 TABLET ORAL DAILY
Qty: 30 TABLET | Refills: 0 | Status: SHIPPED | OUTPATIENT
Start: 2024-10-14 | End: 2024-11-13

## 2024-10-14 RX ORDER — PREDNISONE 2.5 MG/1
5 TABLET ORAL DAILY
Qty: 180 TABLET | Refills: 0 | Status: SHIPPED | OUTPATIENT
Start: 2024-10-14 | End: 2025-01-12

## 2024-10-14 NOTE — TELEPHONE ENCOUNTER
DOLV: 5/20/24  DONV: 11/19/24  LAST LAB DRAW: 8/6/24  LAST TB TEST: 1/3/23    Lab Results   Component Value Date     08/06/2024    K 4.0 08/06/2024     08/06/2024    CO2 26 08/06/2024    BUN 18 08/06/2024    CREATININE 1.01 08/06/2024    GLUCOSE 135 (H) 08/06/2024    CALCIUM 9.2 08/06/2024    BILITOT 0.8 08/06/2024    ALKPHOS 61 08/06/2024    AST 20 08/06/2024    ALT 27 08/06/2024    LABGLOM 104 03/07/2023       No results for input(s): \"WBC\", \"HGB\", \"HCT\", \"MCV\", \"PLT\" in the last 720 hours.    Lab Results   Component Value Date    SEDRATE 20 (H) 08/06/2024       Lab Results   Component Value Date    CRP 0.3 08/06/2024

## 2024-11-15 DIAGNOSIS — Z79.899 ENCOUNTER FOR LONG-TERM (CURRENT) USE OF HIGH-RISK MEDICATION: ICD-10-CM

## 2024-11-15 DIAGNOSIS — M06.00 SERONEGATIVE RHEUMATOID ARTHRITIS (HCC): ICD-10-CM

## 2024-11-15 RX ORDER — LEFLUNOMIDE 20 MG/1
20 TABLET ORAL DAILY
Qty: 30 TABLET | Refills: 0 | OUTPATIENT
Start: 2024-11-15 | End: 2024-12-15

## 2024-11-19 ENCOUNTER — OFFICE VISIT (OUTPATIENT)
Dept: RHEUMATOLOGY | Age: 54
End: 2024-11-19
Payer: COMMERCIAL

## 2024-11-19 VITALS
HEART RATE: 84 BPM | BODY MASS INDEX: 35.86 KG/M2 | WEIGHT: 279.4 LBS | SYSTOLIC BLOOD PRESSURE: 126 MMHG | DIASTOLIC BLOOD PRESSURE: 82 MMHG | HEIGHT: 74 IN | OXYGEN SATURATION: 98 %

## 2024-11-19 DIAGNOSIS — Z79.899 ENCOUNTER FOR LONG-TERM (CURRENT) USE OF HIGH-RISK MEDICATION: ICD-10-CM

## 2024-11-19 DIAGNOSIS — M06.00 SERONEGATIVE RHEUMATOID ARTHRITIS (HCC): Primary | ICD-10-CM

## 2024-11-19 PROCEDURE — 99214 OFFICE O/P EST MOD 30 MIN: CPT | Performed by: NURSE PRACTITIONER

## 2024-11-19 RX ORDER — LEFLUNOMIDE 20 MG/1
20 TABLET ORAL DAILY
Qty: 30 TABLET | Refills: 0 | Status: CANCELLED | OUTPATIENT
Start: 2024-11-19 | End: 2024-12-19

## 2024-11-19 ASSESSMENT — ENCOUNTER SYMPTOMS
DIARRHEA: 0
BACK PAIN: 0
ABDOMINAL PAIN: 0
TROUBLE SWALLOWING: 0
COLOR CHANGE: 0
EYE PAIN: 0
SHORTNESS OF BREATH: 0
BLOOD IN STOOL: 0

## 2024-11-19 NOTE — PROGRESS NOTES
and neck pain.   Skin:  Negative for color change and rash.   Allergic/Immunologic: Negative for immunocompromised state.   Neurological:  Negative for weakness and numbness.          PHYSICAL EXAM:    General appearance: No apparent distress, appears stated age and cooperative.  Eyes: Normal conjunctiva/sclera  Mouth: N/A   Respiratory:  Normal respiratory effort. Clear to auscultation, bilaterally without Rales/Wheezes/Rhonchi.  Cardiovascular: Regular rate and rhythm without murmurs, rubs or gallops.  Musculoskeletal: No clubbing, cyanosis or edema bilaterally. No joint pain to palpation. Heat noted to right knee. Brace in place to right knee.  Skin: Pink, warm, dry. No rashes or lesions.  Neurologic: Alert and oriented, thought content appropriate, normal insight    Vitals:    /82 (Site: Left Lower Arm, Position: Sitting, Cuff Size: Medium Adult)   Pulse 84   Ht 1.88 m (6' 2.02\")   Wt 126.7 kg (279 lb 6.4 oz)   SpO2 98%   BMI 35.86 kg/m²     LABS:      Lab Results   Component Value Date    WBC 5.0 08/06/2024    HGB 13.7 08/06/2024    HCT 42.3 08/06/2024    MCV 85.6 08/06/2024     08/06/2024      Lab Results   Component Value Date     08/06/2024    K 4.0 08/06/2024     08/06/2024    CO2 26 08/06/2024    BUN 18 08/06/2024    CREATININE 1.01 08/06/2024    GLUCOSE 135 (H) 08/06/2024    CALCIUM 9.2 08/06/2024    BILITOT 0.8 08/06/2024    ALKPHOS 61 08/06/2024    AST 20 08/06/2024    ALT 27 08/06/2024    LABGLOM 104 03/07/2023     Lab Results   Component Value Date    SEDRATE 20 (H) 08/06/2024      Lab Results   Component Value Date    CRP 0.3 08/06/2024     Lab Results   Component Value Date/Time    QNTTBGOLD  01/03/2023 12:00 AM      Comment:      negative     Lab Results   Component Value Date    URICACID 5.5 09/22/2022      Lab Results   Component Value Date/Time    [id number] Negative 09/22/2022 09:56 AM      RADIOLOGY:   None     RAPID3 Composite Score = MDHAQ (0-10) + Patient pain

## 2024-11-20 ENCOUNTER — TELEPHONE (OUTPATIENT)
Dept: RHEUMATOLOGY | Age: 54
End: 2024-11-20

## 2024-11-20 DIAGNOSIS — M06.00 SERONEGATIVE RHEUMATOID ARTHRITIS (HCC): ICD-10-CM

## 2024-11-20 DIAGNOSIS — Z79.899 ENCOUNTER FOR LONG-TERM (CURRENT) USE OF HIGH-RISK MEDICATION: ICD-10-CM

## 2024-11-20 LAB
ALBUMIN: 4.4 G/DL (ref 3.5–5.2)
ALK PHOSPHATASE: 59 U/L (ref 39–118)
ALT SERPL-CCNC: 37 U/L (ref 5–41)
ANION GAP SERPL CALCULATED.3IONS-SCNC: 14 MMOL/L (ref 7–16)
AST SERPL-CCNC: 28 U/L (ref 9–50)
BASOPHILS ABSOLUTE: 0.09 K/UL (ref 0–0.2)
BASOPHILS RELATIVE PERCENT: 1.5 % (ref 0–2)
BILIRUB SERPL-MCNC: 0.4 MG/DL
BUN BLDV-MCNC: 23 MG/DL (ref 6–20)
C-REACTIVE PROTEIN: 0.2 MG/DL
CALCIUM SERPL-MCNC: 9.6 MG/DL (ref 8.6–10.5)
CHLORIDE BLD-SCNC: 101 MMOL/L (ref 96–107)
CO2: 25 MMOL/L (ref 18–32)
CREAT SERPL-MCNC: 1.19 MG/DL (ref 0.67–1.3)
EGFR IF NONAFRICAN AMERICAN: 73 ML/MIN/1.73M2
EOSINOPHILS ABSOLUTE: 0.11 K/UL (ref 0–0.8)
EOSINOPHILS RELATIVE PERCENT: 1.9 % (ref 0–5)
GLUCOSE: 106 MG/DL (ref 65–125)
HCT VFR BLD CALC: 40.7 % (ref 39–52)
HEMOGLOBIN: 14 G/DL (ref 13–18)
IMMATURE GRANS (ABS): 0.01 K/UL (ref 0–0.06)
IMMATURE GRANULOCYTES %: 0.2 % (ref 0–2)
LYMPHOCYTES ABSOLUTE: 1.42 K/UL (ref 0.9–5.2)
LYMPHOCYTES RELATIVE PERCENT: 24.1 % (ref 20–45)
MCH RBC QN AUTO: 29 PG (ref 26–32)
MCHC RBC AUTO-ENTMCNC: 34.4 G/DL (ref 32–35)
MCV RBC AUTO: 84 FL (ref 75–100)
MONOCYTES ABSOLUTE: 0.59 K/UL (ref 0.1–1)
MONOCYTES RELATIVE PERCENT: 10 % (ref 0–13)
NEUTROPHILS ABSOLUTE: 3.68 K/UL (ref 1.9–8)
NEUTROPHILS RELATIVE PERCENT: 62.3 % (ref 45–75)
PDW BLD-RTO: 13 % (ref 11.2–14.8)
PLATELET # BLD: 210 THOUS/CMM (ref 140–440)
POTASSIUM SERPL-SCNC: 4 MMOL/L (ref 3.5–5.4)
RBC # BLD: 4.83 MILL/CMM (ref 4.4–6.1)
SED RATE, AUTOMATED: 17 MM/HR (ref 0–19)
SODIUM BLD-SCNC: 140 MMOL/L (ref 135–148)
TOTAL PROTEIN: 7 G/DL (ref 6–8.3)
WBC # BLD: 5.9 THDS/CMM (ref 3.6–11)

## 2024-11-20 RX ORDER — LEFLUNOMIDE 20 MG/1
20 TABLET ORAL DAILY
Qty: 90 TABLET | Refills: 0 | Status: SHIPPED | OUTPATIENT
Start: 2024-11-20 | End: 2025-02-18

## 2024-11-20 NOTE — TELEPHONE ENCOUNTER
----- Message from MARGA Schneider CNP sent at 11/19/2024  2:38 PM EST -----  Regarding: Refill/labs  Had labs at [facility] 11/19/24, needs refill on [facility]F

## 2024-11-20 NOTE — TELEPHONE ENCOUNTER
Please inform the patient we received his labs and they were unremarkable. A prescription for leflunomide was sent to the pharmacy on file.

## 2024-12-20 DIAGNOSIS — Z79.899 ENCOUNTER FOR LONG-TERM (CURRENT) USE OF HIGH-RISK MEDICATION: ICD-10-CM

## 2024-12-20 DIAGNOSIS — M06.00 SERONEGATIVE RHEUMATOID ARTHRITIS (HCC): ICD-10-CM

## 2024-12-20 RX ORDER — LEFLUNOMIDE 20 MG/1
20 TABLET ORAL DAILY
Qty: 90 TABLET | Refills: 0 | OUTPATIENT
Start: 2024-12-20 | End: 2025-03-20

## 2025-02-12 LAB
ALBUMIN: 4.2 G/DL (ref 3.5–5.2)
ALK PHOSPHATASE: 68 U/L (ref 39–118)
ALT SERPL-CCNC: 29 U/L (ref 5–41)
ANION GAP SERPL CALCULATED.3IONS-SCNC: 14 MMOL/L (ref 7–16)
AST SERPL-CCNC: 19 U/L (ref 9–50)
BASOPHILS ABSOLUTE: 0.08 K/UL (ref 0–0.2)
BASOPHILS RELATIVE PERCENT: 2.2 % (ref 0–2)
BILIRUB SERPL-MCNC: 0.5 MG/DL
BUN BLDV-MCNC: 21 MG/DL (ref 6–20)
C-REACTIVE PROTEIN: 0.5 MG/DL
CALCIUM SERPL-MCNC: 9.1 MG/DL (ref 8.6–10.5)
CHLORIDE BLD-SCNC: 103 MMOL/L (ref 96–107)
CO2: 25 MMOL/L (ref 18–32)
CREAT SERPL-MCNC: 0.94 MG/DL (ref 0.67–1.3)
EGFR IF NONAFRICAN AMERICAN: 97 ML/MIN/1.73M2
EOSINOPHILS ABSOLUTE: 0.33 K/UL (ref 0–0.8)
EOSINOPHILS RELATIVE PERCENT: 8.9 % (ref 0–5)
GLUCOSE: 194 MG/DL (ref 70–100)
HCT VFR BLD CALC: 44.5 % (ref 39–52)
HEMOGLOBIN: 14.6 G/DL (ref 13–18)
IMMATURE GRANS (ABS): 0.02 K/UL (ref 0–0.06)
IMMATURE GRANULOCYTES %: 0.5 % (ref 0–2)
LYMPHOCYTES ABSOLUTE: 1.2 K/UL (ref 0.9–5.2)
LYMPHOCYTES RELATIVE PERCENT: 32.4 % (ref 20–45)
MCH RBC QN AUTO: 27.7 PG (ref 26–32)
MCHC RBC AUTO-ENTMCNC: 32.8 G/DL (ref 32–35)
MCV RBC AUTO: 84 FL (ref 75–100)
MONOCYTES ABSOLUTE: 0.38 K/UL (ref 0.1–1)
MONOCYTES RELATIVE PERCENT: 10.3 % (ref 0–13)
NEUTROPHILS ABSOLUTE: 1.69 K/UL (ref 1.9–8)
NEUTROPHILS RELATIVE PERCENT: 45.7 % (ref 45–75)
PDW BLD-RTO: 12.9 % (ref 11.2–14.8)
PLATELET # BLD: 190 THOUS/CMM (ref 140–440)
POTASSIUM SERPL-SCNC: 4.2 MMOL/L (ref 3.5–5.4)
RBC # BLD: 5.27 MILL/CMM (ref 4.4–6.1)
SED RATE, AUTOMATED: 17 MM/HR (ref 0–19)
SODIUM BLD-SCNC: 142 MMOL/L (ref 135–148)
TOTAL PROTEIN: 6.9 G/DL (ref 6–8.3)
WBC # BLD: 3.7 THDS/CMM (ref 3.6–11)

## 2025-02-21 DIAGNOSIS — Z79.899 ENCOUNTER FOR LONG-TERM (CURRENT) USE OF HIGH-RISK MEDICATION: ICD-10-CM

## 2025-02-21 DIAGNOSIS — M06.00 SERONEGATIVE RHEUMATOID ARTHRITIS (HCC): ICD-10-CM

## 2025-02-21 NOTE — TELEPHONE ENCOUNTER
DOLV: 11/19/24  DONV: 05/21/2025  LAST LAB DRAW: 02/11/25  LAST TB TEST: 1/3/23    Lab Results   Component Value Date     02/11/2025    K 4.2 02/11/2025     02/11/2025    CO2 25 02/11/2025    BUN 21 (H) 02/11/2025    CREATININE 0.94 02/11/2025    GLUCOSE 194 (H) 02/11/2025    CALCIUM 9.1 02/11/2025    BILITOT 0.5 02/11/2025    ALKPHOS 68 02/11/2025    AST 19 02/11/2025    ALT 29 02/11/2025    LABGLOM 104 03/07/2023       Recent Labs     02/11/25  0706   WBC 3.7   HGB 14.6   HCT 44.5   MCV 84          Lab Results   Component Value Date    SEDRATE 17 02/11/2025       Lab Results   Component Value Date    CRP 0.5 (H) 02/11/2025

## 2025-02-24 DIAGNOSIS — Z79.899 ENCOUNTER FOR LONG-TERM (CURRENT) USE OF HIGH-RISK MEDICATION: ICD-10-CM

## 2025-02-24 DIAGNOSIS — M06.00 SERONEGATIVE RHEUMATOID ARTHRITIS (HCC): ICD-10-CM

## 2025-02-24 RX ORDER — LEFLUNOMIDE 20 MG/1
20 TABLET ORAL DAILY
Qty: 90 TABLET | Refills: 0 | Status: SHIPPED | OUTPATIENT
Start: 2025-02-24 | End: 2025-02-24 | Stop reason: SDUPTHER

## 2025-02-24 RX ORDER — LEFLUNOMIDE 20 MG/1
20 TABLET ORAL DAILY
Qty: 90 TABLET | Refills: 0 | Status: SHIPPED | OUTPATIENT
Start: 2025-02-24 | End: 2025-05-25

## 2025-03-12 ENCOUNTER — TELEPHONE (OUTPATIENT)
Age: 55
End: 2025-03-12

## 2025-03-12 DIAGNOSIS — M06.00 SERONEGATIVE RHEUMATOID ARTHRITIS (HCC): ICD-10-CM

## 2025-03-12 DIAGNOSIS — Z79.899 ENCOUNTER FOR LONG-TERM (CURRENT) USE OF HIGH-RISK MEDICATION: ICD-10-CM

## 2025-03-12 RX ORDER — PREDNISONE 2.5 MG/1
2.5 TABLET ORAL DAILY
Qty: 90 TABLET | Refills: 0 | Status: SHIPPED | OUTPATIENT
Start: 2025-03-12 | End: 2025-06-10

## 2025-03-12 NOTE — TELEPHONE ENCOUNTER
----- Message from MARGA Schneider CNP sent at 3/12/2025 10:06 AM EDT -----  Regarding: prednisone  Please verify with the patient if he is taking 1 or 2 tabs daily of the prednisone.

## 2025-05-19 ENCOUNTER — OFFICE VISIT (OUTPATIENT)
Age: 55
End: 2025-05-19
Payer: COMMERCIAL

## 2025-05-19 VITALS
HEART RATE: 72 BPM | DIASTOLIC BLOOD PRESSURE: 72 MMHG | SYSTOLIC BLOOD PRESSURE: 140 MMHG | BODY MASS INDEX: 37.17 KG/M2 | HEIGHT: 74 IN | WEIGHT: 289.6 LBS | OXYGEN SATURATION: 96 %

## 2025-05-19 DIAGNOSIS — Z79.899 ENCOUNTER FOR LONG-TERM (CURRENT) USE OF HIGH-RISK MEDICATION: ICD-10-CM

## 2025-05-19 DIAGNOSIS — M06.00 SERONEGATIVE RHEUMATOID ARTHRITIS (HCC): Primary | ICD-10-CM

## 2025-05-19 PROCEDURE — 99214 OFFICE O/P EST MOD 30 MIN: CPT | Performed by: INTERNAL MEDICINE

## 2025-05-19 RX ORDER — LEFLUNOMIDE 20 MG/1
20 TABLET ORAL DAILY
Qty: 90 TABLET | Refills: 0 | Status: SHIPPED | OUTPATIENT
Start: 2025-05-19 | End: 2025-08-17

## 2025-05-19 RX ORDER — PREDNISONE 2.5 MG/1
2.5 TABLET ORAL DAILY
Qty: 90 TABLET | Refills: 0 | Status: SHIPPED | OUTPATIENT
Start: 2025-05-19 | End: 2025-08-17

## 2025-05-19 RX ORDER — IPRATROPIUM BROMIDE AND ALBUTEROL SULFATE 2.5; .5 MG/3ML; MG/3ML
SOLUTION RESPIRATORY (INHALATION)
COMMUNITY
Start: 2025-04-30

## 2025-05-19 RX ORDER — BUDESONIDE AND FORMOTEROL FUMARATE DIHYDRATE 160; 4.5 UG/1; UG/1
2 AEROSOL RESPIRATORY (INHALATION) 2 TIMES DAILY
COMMUNITY

## 2025-05-19 ASSESSMENT — ENCOUNTER SYMPTOMS
SHORTNESS OF BREATH: 0
ABDOMINAL PAIN: 0
NAUSEA: 0
COUGH: 0
VOMITING: 0

## 2025-05-19 NOTE — PROGRESS NOTES
[facility]   Rheumatology Clinic Note      5/19/2025       CHIEF COMPLAINT:    Chief Complaint   Patient presents with    6 Month Follow-Up     Seronegative rheumatoid arthritis (HCC)    Other     Patient is tolerating         HISTORY OF PRESENT ILLNESS:    55 y.o. male with suspected inflammatory arthritis (?seronegative RA) presents for follow up. He is presently on leflunomide 20 mg daily and prednisone 2.5 mg daily.    Past med: methotrexate (ineffective), hydroxychloroquine (GI side effects).    Overall is doing well. No joint pain, joint swelling. Has days when morning stiffness lasts for 30 minutes.  Has tolerable stiffness in his PIPs during the day.  No arthritis flare ups since seen last.    Is undergoing evaluation with orthopedics for right shoulder pain. Is undergoing physical therapy. Pain ongoing for 4-5 months. [place] a pop in right shoulder when carrying an exercise equipment.    He is following with asthma. States that his breathing status improved with current treatment.      Initial consultation 9/22/2022:  Summer 2022, started abruptly with dull ache in both shoulders. Right is worse than left. This as progressively worsen. Associated with stiffness. Has difficulty raising his arms without assistance. Was prescribed prednisone 15 mg, which helped a lot.   Then symtpoms worsen and progressed to involve his hips and knees, so prednsione dose was increased to 20 mg daily, which again helped.      Past Medical History:     has a past medical history of Hypothyroidism.    Past Surgical History:     has a past surgical history that includes Lumbar disc surgery (2003); Lumbar disc surgery (1997); and Rotator cuff repair (Right).    Current Medications:      Current Outpatient Medications:     ipratropium 0.5 mg-albuterol 2.5 mg (DUONEB) 0.5-2.5 (3) MG/3ML SOLN nebulizer solution, USE 1 AMPULE IN NEBULIZER EVERY 6 HOURS AS NEEDED, Disp: , Rfl:     budesonide-formoterol (SYMBICORT) 160-4.5

## 2025-05-21 LAB
ALBUMIN: 4.2 G/DL (ref 3.5–5.2)
ALK PHOSPHATASE: 68 U/L (ref 39–118)
ALT SERPL-CCNC: 38 U/L (ref 5–41)
ANION GAP SERPL CALCULATED.3IONS-SCNC: 14 MMOL/L (ref 7–16)
AST SERPL-CCNC: 25 U/L (ref 9–50)
BASOPHILS ABSOLUTE: 0.06 K/UL (ref 0–0.2)
BASOPHILS RELATIVE PERCENT: 1.4 % (ref 0–2)
BILIRUB SERPL-MCNC: 0.6 MG/DL
BUN BLDV-MCNC: 15 MG/DL (ref 6–20)
C-REACTIVE PROTEIN: 0.4 MG/DL
CALCIUM SERPL-MCNC: 9.3 MG/DL (ref 8.6–10.5)
CHLORIDE BLD-SCNC: 104 MMOL/L (ref 96–107)
CO2: 24 MMOL/L (ref 18–32)
CREAT SERPL-MCNC: 0.92 MG/DL (ref 0.67–1.3)
EGFR IF NONAFRICAN AMERICAN: 99 ML/MIN/1.73M2
EOSINOPHILS ABSOLUTE: 0.33 K/UL (ref 0–0.8)
EOSINOPHILS RELATIVE PERCENT: 7.8 % (ref 0–5)
GLUCOSE: 151 MG/DL (ref 70–100)
HCT VFR BLD CALC: 43.1 % (ref 39–52)
HEMOGLOBIN: 13.8 G/DL (ref 13–18)
IMMATURE GRANS (ABS): 0.02 K/UL (ref 0–0.06)
IMMATURE GRANULOCYTES %: 0.5 % (ref 0–2)
LYMPHOCYTES ABSOLUTE: 1.23 K/UL (ref 0.9–5.2)
LYMPHOCYTES RELATIVE PERCENT: 29.2 % (ref 20–45)
MCH RBC QN AUTO: 27.1 PG (ref 26–32)
MCHC RBC AUTO-ENTMCNC: 32 G/DL (ref 32–35)
MCV RBC AUTO: 85 FL (ref 75–100)
MONOCYTES ABSOLUTE: 0.41 K/UL (ref 0.1–1)
MONOCYTES RELATIVE PERCENT: 9.7 % (ref 0–13)
NEUTROPHILS ABSOLUTE: 2.16 K/UL (ref 1.9–8)
NEUTROPHILS RELATIVE PERCENT: 51.4 % (ref 45–75)
PDW BLD-RTO: 13.3 % (ref 11.2–14.8)
PLATELET # BLD: 163 THOUS/CMM (ref 140–440)
POTASSIUM SERPL-SCNC: 4 MMOL/L (ref 3.5–5.4)
RBC # BLD: 5.09 MILL/CMM (ref 4.4–6.1)
SED RATE, AUTOMATED: 24 MM/HR (ref 0–19)
SODIUM BLD-SCNC: 142 MMOL/L (ref 135–148)
TOTAL PROTEIN: 6.8 G/DL (ref 6–8.3)
WBC # BLD: 4.2 THDS/CMM (ref 3.6–11)

## 2025-08-27 DIAGNOSIS — Z79.899 ENCOUNTER FOR LONG-TERM (CURRENT) USE OF HIGH-RISK MEDICATION: ICD-10-CM

## 2025-08-27 DIAGNOSIS — M06.00 SERONEGATIVE RHEUMATOID ARTHRITIS (HCC): ICD-10-CM

## 2025-08-27 RX ORDER — LEFLUNOMIDE 20 MG/1
20 TABLET ORAL DAILY
Qty: 90 TABLET | Refills: 0 | OUTPATIENT
Start: 2025-08-27

## 2025-09-05 LAB
ALBUMIN: 4.2 G/DL (ref 3.5–5.2)
ALK PHOSPHATASE: 77 U/L (ref 39–118)
ALT SERPL-CCNC: 61 U/L (ref 5–41)
ANION GAP SERPL CALCULATED.3IONS-SCNC: 12 MMOL/L (ref 7–16)
AST SERPL-CCNC: 39 U/L (ref 9–50)
BASOPHILS ABSOLUTE: 0.07 K/UL (ref 0–0.2)
BASOPHILS RELATIVE PERCENT: 1.4 % (ref 0–2)
BILIRUB SERPL-MCNC: 0.5 MG/DL
BUN BLDV-MCNC: 17 MG/DL (ref 6–20)
C-REACTIVE PROTEIN: 0.4 MG/DL
CALCIUM SERPL-MCNC: 9.9 MG/DL (ref 8.6–10.5)
CHLORIDE BLD-SCNC: 100 MMOL/L (ref 96–107)
CO2: 26 MMOL/L (ref 18–32)
CREAT SERPL-MCNC: 0.85 MG/DL (ref 0.67–1.3)
EGFR IF NONAFRICAN AMERICAN: 103 ML/MIN/1.73M2
EOSINOPHILS ABSOLUTE: 0.4 K/UL (ref 0–0.8)
EOSINOPHILS RELATIVE PERCENT: 7.8 % (ref 0–5)
GLUCOSE: 168 MG/DL (ref 70–100)
HCT VFR BLD CALC: 45.2 % (ref 39–52)
HEMOGLOBIN: 14.1 G/DL (ref 13–18)
IMMATURE GRANS (ABS): 0.01 K/UL (ref 0–0.06)
IMMATURE GRANULOCYTES %: 0.2 % (ref 0–2)
LYMPHOCYTES ABSOLUTE: 1.06 K/UL (ref 0.9–5.2)
LYMPHOCYTES RELATIVE PERCENT: 20.7 % (ref 20–45)
MCH RBC QN AUTO: 26.7 PG (ref 26–32)
MCHC RBC AUTO-ENTMCNC: 31.2 G/DL (ref 31–36)
MCV RBC AUTO: 86 FL (ref 75–100)
MONOCYTES ABSOLUTE: 0.42 K/UL (ref 0.1–1)
MONOCYTES RELATIVE PERCENT: 8.2 % (ref 0–13)
NEUTROPHILS ABSOLUTE: 3.15 K/UL (ref 1.9–8)
NEUTROPHILS RELATIVE PERCENT: 61.7 % (ref 45–75)
PDW BLD-RTO: 12.8 % (ref 11.2–14.8)
PLATELET # BLD: 203 THOUS/CMM (ref 140–440)
POTASSIUM SERPL-SCNC: 4 MMOL/L (ref 3.5–5.4)
RBC # BLD: 5.28 MILL/CMM (ref 4.4–6.1)
SED RATE, AUTOMATED: 27 MM/HR (ref 0–19)
SODIUM BLD-SCNC: 138 MMOL/L (ref 135–148)
TOTAL PROTEIN: 6.7 G/DL (ref 6–8.3)
WBC # BLD: 5.1 THDS/CMM (ref 3.6–11)